# Patient Record
Sex: MALE | Race: WHITE | Employment: FULL TIME | URBAN - METROPOLITAN AREA
[De-identification: names, ages, dates, MRNs, and addresses within clinical notes are randomized per-mention and may not be internally consistent; named-entity substitution may affect disease eponyms.]

---

## 2017-02-02 NOTE — TELEPHONE ENCOUNTER
Refill request received for Montelukast sodium. This has not been prescribed by . I contacted [facility] pharmacy and was told that Rx came from [address] [place] [address] in [place], date of Rx 12/31/16.  I called the patient who states that he did take

## 2017-02-03 RX ORDER — MONTELUKAST SODIUM 10 MG/1
TABLET ORAL
Qty: 30 TABLET | Refills: 3 | Status: SHIPPED | OUTPATIENT
Start: 2017-02-03 | End: 2017-06-10

## 2017-02-03 NOTE — TELEPHONE ENCOUNTER
Per Dr. Sims Learn written order (see in scanned section)- okay to fill with 3 additional refills. eRx sent. Patient notified.

## 2017-04-04 RX ORDER — ENALAPRIL MALEATE 5 MG/1
TABLET ORAL
Qty: 30 TABLET | Refills: 6 | Status: SHIPPED | OUTPATIENT
Start: 2017-04-04 | End: 2017-11-04

## 2017-04-17 ENCOUNTER — TELEPHONE (OUTPATIENT)
Dept: INTERNAL MEDICINE CLINIC | Facility: CLINIC | Age: 47
End: 2017-04-17

## 2017-04-17 ENCOUNTER — OFFICE VISIT (OUTPATIENT)
Dept: INTERNAL MEDICINE CLINIC | Facility: CLINIC | Age: 47
End: 2017-04-17

## 2017-04-17 VITALS
BODY MASS INDEX: 31 KG/M2 | WEIGHT: 196 LBS | OXYGEN SATURATION: 98 % | HEART RATE: 118 BPM | DIASTOLIC BLOOD PRESSURE: 110 MMHG | SYSTOLIC BLOOD PRESSURE: 146 MMHG | TEMPERATURE: 99 F

## 2017-04-17 DIAGNOSIS — I10 ESSENTIAL HYPERTENSION: Primary | ICD-10-CM

## 2017-04-17 DIAGNOSIS — M79.18 BRACHIORADIALIS MUSCLE TENDERNESS: Primary | ICD-10-CM

## 2017-04-17 DIAGNOSIS — M77.8 FOREARM TENDONITIS: ICD-10-CM

## 2017-04-17 PROCEDURE — 99212 OFFICE O/P EST SF 10 MIN: CPT | Performed by: INTERNAL MEDICINE

## 2017-04-17 PROCEDURE — 99213 OFFICE O/P EST LOW 20 MIN: CPT | Performed by: INTERNAL MEDICINE

## 2017-04-17 RX ORDER — AMLODIPINE BESYLATE 2.5 MG/1
2.5 TABLET ORAL DAILY
Qty: 30 TABLET | Refills: 0 | Status: SHIPPED | OUTPATIENT
Start: 2017-04-17 | End: 2017-05-14

## 2017-04-17 RX ORDER — TRAMADOL HYDROCHLORIDE 50 MG/1
50 TABLET ORAL EVERY 6 HOURS PRN
Qty: 30 TABLET | Refills: 1 | Status: SHIPPED | OUTPATIENT
Start: 2017-04-17 | End: 2017-12-20

## 2017-04-17 NOTE — TELEPHONE ENCOUNTER
Nursing please call ortho at 8am on 4/18/17 for pt to be seen on 4/18/17 for possible forearm tendon rupture.  Offices were closed when I tried calling at 6pm. Please call any ortho-Dr. Ana Chavez [phone]; or Dr. Milton Lockett [phone]; or Dr. Deyanira Xiong [phone]

## 2017-04-17 NOTE — PROGRESS NOTES
Tyler Marie is a 41year old male. Patient presents with:  Arm Pain: Pt states he was pushing cable in a pipe last Monday and injured R forearm, constant pain, pain level 9/10, he is not taking anything otc, he has ROM, unable to lift 2lbs       HPI: wheezing or rhonchi  CARDIO: RRR, normal S1S2, no gallops or murmurs  Musculoskeletal: Hypertonicity of brachioradialis in the right forearm with some tenderness to palpation.   Normal range with passive motion of the wrist including flexion, extension, sup

## 2017-04-18 NOTE — TELEPHONE ENCOUNTER
Called patient got him in with Dr. Fish Miller today at 1:00. Dr. Madi Miller was not in office today. Gave patient address and phone info.

## 2017-05-01 ENCOUNTER — APPOINTMENT (OUTPATIENT)
Dept: PHYSICAL THERAPY | Age: 47
End: 2017-05-01
Attending: ORTHOPAEDIC SURGERY
Payer: COMMERCIAL

## 2017-05-04 ENCOUNTER — APPOINTMENT (OUTPATIENT)
Dept: PHYSICAL THERAPY | Age: 47
End: 2017-05-04
Attending: ORTHOPAEDIC SURGERY
Payer: COMMERCIAL

## 2017-05-08 ENCOUNTER — APPOINTMENT (OUTPATIENT)
Dept: PHYSICAL THERAPY | Age: 47
End: 2017-05-08
Attending: ORTHOPAEDIC SURGERY
Payer: COMMERCIAL

## 2017-05-11 ENCOUNTER — APPOINTMENT (OUTPATIENT)
Dept: PHYSICAL THERAPY | Age: 47
End: 2017-05-11
Attending: ORTHOPAEDIC SURGERY
Payer: COMMERCIAL

## 2017-05-16 ENCOUNTER — APPOINTMENT (OUTPATIENT)
Dept: PHYSICAL THERAPY | Age: 47
End: 2017-05-16
Attending: ORTHOPAEDIC SURGERY
Payer: COMMERCIAL

## 2017-05-17 NOTE — TELEPHONE ENCOUNTER
LMTCB - lst OV Amlodipine added d/t high BP;  Pt was to follow up ;   We will need to talk to him prior to refill

## 2017-05-18 ENCOUNTER — APPOINTMENT (OUTPATIENT)
Dept: PHYSICAL THERAPY | Age: 47
End: 2017-05-18
Attending: ORTHOPAEDIC SURGERY
Payer: COMMERCIAL

## 2017-05-22 ENCOUNTER — APPOINTMENT (OUTPATIENT)
Dept: PHYSICAL THERAPY | Age: 47
End: 2017-05-22
Attending: ORTHOPAEDIC SURGERY
Payer: COMMERCIAL

## 2017-05-25 ENCOUNTER — APPOINTMENT (OUTPATIENT)
Dept: PHYSICAL THERAPY | Age: 47
End: 2017-05-25
Attending: ORTHOPAEDIC SURGERY
Payer: COMMERCIAL

## 2017-05-26 RX ORDER — AMLODIPINE BESYLATE 2.5 MG/1
TABLET ORAL
Qty: 30 TABLET | Refills: 0 | Status: SHIPPED | OUTPATIENT
Start: 2017-05-26 | End: 2017-06-28

## 2017-05-26 NOTE — TELEPHONE ENCOUNTER
Patient was given 1 refill, please call again and have him follow up with Dr. Rigo Wong for further evaluation.

## 2017-06-12 ENCOUNTER — OFFICE VISIT (OUTPATIENT)
Dept: INTERNAL MEDICINE CLINIC | Facility: CLINIC | Age: 47
End: 2017-06-12

## 2017-06-12 VITALS
HEART RATE: 116 BPM | BODY MASS INDEX: 30.61 KG/M2 | TEMPERATURE: 99 F | HEIGHT: 67 IN | SYSTOLIC BLOOD PRESSURE: 144 MMHG | DIASTOLIC BLOOD PRESSURE: 80 MMHG | WEIGHT: 195 LBS

## 2017-06-12 DIAGNOSIS — I15.9 SECONDARY HYPERTENSION: ICD-10-CM

## 2017-06-12 DIAGNOSIS — E78.00 PURE HYPERCHOLESTEROLEMIA: ICD-10-CM

## 2017-06-12 DIAGNOSIS — M77.8 TENDINITIS OF ELBOW: Primary | ICD-10-CM

## 2017-06-12 PROBLEM — I10 HYPERTENSION: Status: ACTIVE | Noted: 2017-06-12

## 2017-06-12 PROCEDURE — 99214 OFFICE O/P EST MOD 30 MIN: CPT | Performed by: INTERNAL MEDICINE

## 2017-06-12 PROCEDURE — 99212 OFFICE O/P EST SF 10 MIN: CPT | Performed by: INTERNAL MEDICINE

## 2017-06-12 NOTE — PROGRESS NOTES
Mabelene Angelucci is a 41year old male. HPI:   1. Tendinitis of elbow - he has lateral epicondylitis of the right elbow - off work since 4/18 - seeing Dr Ras Renee - receiving PT, NSAIDs. 2. Pure hypercholesterolemia - last        3.  Romayne Boop atraumatic, normocephalic,ears and throat are clear  NECK: supple,no adenopathy,no bruits  LUNGS: clear to auscultation  CARDIO: RRR without murmur  GI: good BS's,no masses, HSM or tenderness  EXTREMITIES: no cyanosis, clubbing or edema    ASSESSMENT AND P

## 2017-06-13 RX ORDER — MONTELUKAST SODIUM 10 MG/1
TABLET ORAL
Qty: 90 TABLET | Refills: 3 | Status: SHIPPED | OUTPATIENT
Start: 2017-06-13 | End: 2018-06-12

## 2017-06-28 RX ORDER — AMLODIPINE BESYLATE 2.5 MG/1
TABLET ORAL
Qty: 30 TABLET | Refills: 5 | Status: SHIPPED | OUTPATIENT
Start: 2017-06-28 | End: 2018-01-10

## 2017-11-04 RX ORDER — ENALAPRIL MALEATE 5 MG/1
TABLET ORAL
Qty: 90 TABLET | Refills: 1 | Status: SHIPPED | OUTPATIENT
Start: 2017-11-04 | End: 2018-08-06

## 2017-12-09 ENCOUNTER — LAB ENCOUNTER (OUTPATIENT)
Dept: LAB | Age: 47
End: 2017-12-09
Attending: INTERNAL MEDICINE
Payer: COMMERCIAL

## 2017-12-09 DIAGNOSIS — I15.9 SECONDARY HYPERTENSION: ICD-10-CM

## 2017-12-09 DIAGNOSIS — M77.8 TENDINITIS OF ELBOW: ICD-10-CM

## 2017-12-09 DIAGNOSIS — E78.00 PURE HYPERCHOLESTEROLEMIA: ICD-10-CM

## 2017-12-09 PROCEDURE — 81001 URINALYSIS AUTO W/SCOPE: CPT

## 2017-12-09 PROCEDURE — 81015 MICROSCOPIC EXAM OF URINE: CPT

## 2017-12-09 PROCEDURE — 36415 COLL VENOUS BLD VENIPUNCTURE: CPT

## 2017-12-09 PROCEDURE — 80061 LIPID PANEL: CPT

## 2017-12-09 PROCEDURE — 80053 COMPREHEN METABOLIC PANEL: CPT

## 2017-12-09 PROCEDURE — 84443 ASSAY THYROID STIM HORMONE: CPT

## 2017-12-09 PROCEDURE — 85025 COMPLETE CBC W/AUTO DIFF WBC: CPT

## 2017-12-11 ENCOUNTER — TELEPHONE (OUTPATIENT)
Dept: INTERNAL MEDICINE CLINIC | Facility: CLINIC | Age: 47
End: 2017-12-11

## 2017-12-11 ENCOUNTER — APPOINTMENT (OUTPATIENT)
Dept: LAB | Age: 47
End: 2017-12-11
Attending: INTERNAL MEDICINE
Payer: COMMERCIAL

## 2017-12-11 DIAGNOSIS — E87.5 HYPERKALEMIA: Primary | ICD-10-CM

## 2017-12-11 DIAGNOSIS — E87.5 HYPERKALEMIA: ICD-10-CM

## 2017-12-11 PROCEDURE — 36415 COLL VENOUS BLD VENIPUNCTURE: CPT

## 2017-12-11 PROCEDURE — 80048 BASIC METABOLIC PNL TOTAL CA: CPT

## 2017-12-11 NOTE — TELEPHONE ENCOUNTER
Please let patient know that in the absence of Dr. Hesham Aguilar I reviewed his labs. Potassium count a little bit elevated. Cholesterol was elevated. Dr. Hesham Aguilar will review. Specifically for the potassium we should repeat. I left order for BMP.   If he could r

## 2017-12-11 NOTE — TELEPHONE ENCOUNTER
Patient called back. He had additional questions. He wanted to know how high his potassium was. He wanted to know what his cholesterol was. I relayed those numbers to him. He wanted to know how concerned he should be about his potassium.  I told him that

## 2017-12-20 ENCOUNTER — OFFICE VISIT (OUTPATIENT)
Dept: INTERNAL MEDICINE CLINIC | Facility: CLINIC | Age: 47
End: 2017-12-20

## 2017-12-20 VITALS
SYSTOLIC BLOOD PRESSURE: 120 MMHG | HEIGHT: 67 IN | HEART RATE: 84 BPM | DIASTOLIC BLOOD PRESSURE: 78 MMHG | TEMPERATURE: 99 F | WEIGHT: 198 LBS | BODY MASS INDEX: 31.08 KG/M2

## 2017-12-20 DIAGNOSIS — I15.9 SECONDARY HYPERTENSION: Primary | ICD-10-CM

## 2017-12-20 DIAGNOSIS — E78.00 PURE HYPERCHOLESTEROLEMIA: ICD-10-CM

## 2017-12-20 DIAGNOSIS — M54.2 CERVICAL PAIN (NECK): ICD-10-CM

## 2017-12-20 PROCEDURE — 99212 OFFICE O/P EST SF 10 MIN: CPT | Performed by: INTERNAL MEDICINE

## 2017-12-20 PROCEDURE — 99214 OFFICE O/P EST MOD 30 MIN: CPT | Performed by: INTERNAL MEDICINE

## 2017-12-20 RX ORDER — ATORVASTATIN CALCIUM 20 MG/1
20 TABLET, FILM COATED ORAL NIGHTLY
Qty: 30 TABLET | Refills: 12 | Status: SHIPPED | OUTPATIENT
Start: 2017-12-20 | End: 2019-01-18

## 2017-12-20 NOTE — PROGRESS NOTES
Elsy Lawler is a 41year old male. HPI:   He has been doing well but very concerned about his father about to have cardiac stent. He feels well, has no complaints other than right tennis elbow. SR: no chest pain or sob, no gu or gi sx.       1. denies heartburn  NEURO: denies headaches    EXAM:   /78   Pulse 84   Temp 98.6 °F (37 °C) (Oral)   Ht 5' 7\" (1.702 m)   Wt 198 lb (89.8 kg)   BMI 31.01 kg/m²   GENERAL: well developed, well nourished,in no apparent distress  SKIN: no rashes,no susp

## 2018-01-10 RX ORDER — AMLODIPINE BESYLATE 2.5 MG/1
TABLET ORAL
Qty: 30 TABLET | Refills: 5 | Status: SHIPPED | OUTPATIENT
Start: 2018-01-10 | End: 2018-07-18

## 2018-01-20 ENCOUNTER — HOSPITAL ENCOUNTER (OUTPATIENT)
Facility: HOSPITAL | Age: 48
Setting detail: OBSERVATION
Discharge: HOME | End: 2018-01-21
Admitting: HOSPITALIST
Payer: COMMERCIAL

## 2018-01-20 ENCOUNTER — APPOINTMENT (OUTPATIENT)
Dept: GENERAL RADIOLOGY | Facility: HOSPITAL | Age: 48
End: 2018-01-20
Payer: COMMERCIAL

## 2018-01-20 DIAGNOSIS — R07.9 ACUTE CHEST PAIN: Primary | ICD-10-CM

## 2018-01-20 LAB
ALBUMIN SERPL BCP-MCNC: 4.3 G/DL (ref 3.5–4.8)
ALP SERPL-CCNC: 74 U/L (ref 32–100)
ALT SERPL-CCNC: 57 U/L (ref 17–63)
ANION GAP SERPL CALC-SCNC: 10 MMOL/L (ref 0–18)
AST SERPL-CCNC: 30 U/L (ref 15–41)
BASOPHILS # BLD: 0.1 K/UL (ref 0–0.2)
BASOPHILS NFR BLD: 1 %
BILIRUB DIRECT SERPL-MCNC: 0.1 MG/DL (ref 0–0.2)
BILIRUB SERPL-MCNC: 0.6 MG/DL (ref 0.3–1.2)
BUN SERPL-MCNC: 11 MG/DL (ref 8–20)
BUN/CREAT SERPL: 11.8 (ref 10–20)
CALCIUM SERPL-MCNC: 9.6 MG/DL (ref 8.5–10.5)
CHLORIDE SERPL-SCNC: 103 MMOL/L (ref 95–110)
CO2 SERPL-SCNC: 26 MMOL/L (ref 22–32)
CREAT SERPL-MCNC: 0.93 MG/DL (ref 0.5–1.5)
D DIMER PPP FEU-MCNC: 0.41 MCG/ML (ref ?–0.5)
EOSINOPHIL # BLD: 0.1 K/UL (ref 0–0.7)
EOSINOPHIL NFR BLD: 1 %
ERYTHROCYTE [DISTWIDTH] IN BLOOD BY AUTOMATED COUNT: 13 % (ref 11–15)
GLUCOSE BLDC GLUCOMTR-MCNC: 110 MG/DL (ref 70–99)
GLUCOSE BLDC GLUCOMTR-MCNC: 97 MG/DL (ref 70–99)
GLUCOSE SERPL-MCNC: 115 MG/DL (ref 70–99)
HCT VFR BLD AUTO: 48.4 % (ref 41–52)
HGB BLD-MCNC: 16.1 G/DL (ref 13.5–17.5)
LIPASE SERPL-CCNC: 19 U/L (ref 22–51)
LYMPHOCYTES # BLD: 2.1 K/UL (ref 1–4)
LYMPHOCYTES NFR BLD: 25 %
MCH RBC QN AUTO: 28.3 PG (ref 27–32)
MCHC RBC AUTO-ENTMCNC: 33.3 G/DL (ref 32–37)
MCV RBC AUTO: 85.1 FL (ref 80–100)
MONOCYTES # BLD: 0.5 K/UL (ref 0–1)
MONOCYTES NFR BLD: 6 %
NEUTROPHILS # BLD AUTO: 5.7 K/UL (ref 1.8–7.7)
NEUTROPHILS NFR BLD: 67 %
OSMOLALITY UR CALC.SUM OF ELEC: 288 MOSM/KG (ref 275–295)
PLATELET # BLD AUTO: 293 K/UL (ref 140–400)
PMV BLD AUTO: 7.4 FL (ref 7.4–10.3)
POTASSIUM SERPL-SCNC: 4.2 MMOL/L (ref 3.3–5.1)
PROT SERPL-MCNC: 7.8 G/DL (ref 5.9–8.4)
RBC # BLD AUTO: 5.69 M/UL (ref 4.5–5.9)
SODIUM SERPL-SCNC: 139 MMOL/L (ref 136–144)
TROPONIN I SERPL-MCNC: 0 NG/ML (ref ?–0.03)
WBC # BLD AUTO: 8.5 K/UL (ref 4–11)

## 2018-01-20 PROCEDURE — 99220 INITIAL OBSERVATION CARE,LEVL III: CPT | Performed by: HOSPITALIST

## 2018-01-20 PROCEDURE — 71046 X-RAY EXAM CHEST 2 VIEWS: CPT

## 2018-01-20 RX ORDER — ENOXAPARIN SODIUM 100 MG/ML
40 INJECTION SUBCUTANEOUS DAILY
Status: DISCONTINUED | OUTPATIENT
Start: 2018-01-20 | End: 2018-01-21

## 2018-01-20 RX ORDER — MONTELUKAST SODIUM 10 MG/1
10 TABLET ORAL DAILY
Status: DISCONTINUED | OUTPATIENT
Start: 2018-01-21 | End: 2018-01-21

## 2018-01-20 RX ORDER — DOCUSATE SODIUM 100 MG/1
100 CAPSULE, LIQUID FILLED ORAL 2 TIMES DAILY
Status: DISCONTINUED | OUTPATIENT
Start: 2018-01-20 | End: 2018-01-21

## 2018-01-20 RX ORDER — ENALAPRIL MALEATE 5 MG/1
5 TABLET ORAL DAILY
Status: DISCONTINUED | OUTPATIENT
Start: 2018-01-21 | End: 2018-01-21

## 2018-01-20 RX ORDER — NITROGLYCERIN 0.4 MG/1
0.4 TABLET SUBLINGUAL EVERY 5 MIN PRN
Status: DISCONTINUED | OUTPATIENT
Start: 2018-01-20 | End: 2018-01-21

## 2018-01-20 RX ORDER — MORPHINE SULFATE 2 MG/ML
1 INJECTION, SOLUTION INTRAMUSCULAR; INTRAVENOUS EVERY 2 HOUR PRN
Status: DISCONTINUED | OUTPATIENT
Start: 2018-01-20 | End: 2018-01-21

## 2018-01-20 RX ORDER — ACETAMINOPHEN 325 MG/1
650 TABLET ORAL EVERY 6 HOURS PRN
Status: DISCONTINUED | OUTPATIENT
Start: 2018-01-20 | End: 2018-01-21

## 2018-01-20 RX ORDER — ASPIRIN 325 MG
325 TABLET, DELAYED RELEASE (ENTERIC COATED) ORAL DAILY
Status: DISCONTINUED | OUTPATIENT
Start: 2018-01-20 | End: 2018-01-21

## 2018-01-20 RX ORDER — BISACODYL 10 MG
10 SUPPOSITORY, RECTAL RECTAL
Status: DISCONTINUED | OUTPATIENT
Start: 2018-01-20 | End: 2018-01-21

## 2018-01-20 RX ORDER — MORPHINE SULFATE 2 MG/ML
2 INJECTION, SOLUTION INTRAMUSCULAR; INTRAVENOUS EVERY 2 HOUR PRN
Status: DISCONTINUED | OUTPATIENT
Start: 2018-01-20 | End: 2018-01-21

## 2018-01-20 RX ORDER — ONDANSETRON 4 MG/1
4 TABLET, ORALLY DISINTEGRATING ORAL ONCE
Status: DISCONTINUED | OUTPATIENT
Start: 2018-01-20 | End: 2018-01-20

## 2018-01-20 RX ORDER — POLYETHYLENE GLYCOL 3350 17 G/17G
17 POWDER, FOR SOLUTION ORAL DAILY PRN
Status: DISCONTINUED | OUTPATIENT
Start: 2018-01-20 | End: 2018-01-21

## 2018-01-20 RX ORDER — ONDANSETRON 2 MG/ML
4 INJECTION INTRAMUSCULAR; INTRAVENOUS ONCE
Status: COMPLETED | OUTPATIENT
Start: 2018-01-20 | End: 2018-01-20

## 2018-01-20 RX ORDER — SODIUM CHLORIDE 0.9 % (FLUSH) 0.9 %
3 SYRINGE (ML) INJECTION AS NEEDED
Status: DISCONTINUED | OUTPATIENT
Start: 2018-01-20 | End: 2018-01-21

## 2018-01-20 RX ORDER — MORPHINE SULFATE 4 MG/ML
4 INJECTION, SOLUTION INTRAMUSCULAR; INTRAVENOUS EVERY 2 HOUR PRN
Status: DISCONTINUED | OUTPATIENT
Start: 2018-01-20 | End: 2018-01-21

## 2018-01-20 RX ORDER — AMLODIPINE BESYLATE 2.5 MG/1
2.5 TABLET ORAL DAILY
Status: DISCONTINUED | OUTPATIENT
Start: 2018-01-21 | End: 2018-01-21

## 2018-01-20 RX ORDER — ATORVASTATIN CALCIUM 20 MG/1
20 TABLET, FILM COATED ORAL NIGHTLY
Status: DISCONTINUED | OUTPATIENT
Start: 2018-01-20 | End: 2018-01-21

## 2018-01-20 RX ORDER — LORAZEPAM 0.5 MG/1
0.5 TABLET ORAL EVERY 6 HOURS PRN
Status: DISCONTINUED | OUTPATIENT
Start: 2018-01-20 | End: 2018-01-21

## 2018-01-20 RX ORDER — SODIUM PHOSPHATE, DIBASIC AND SODIUM PHOSPHATE, MONOBASIC 7; 19 G/133ML; G/133ML
1 ENEMA RECTAL ONCE AS NEEDED
Status: DISCONTINUED | OUTPATIENT
Start: 2018-01-20 | End: 2018-01-21

## 2018-01-20 RX ORDER — FLUTICASONE PROPIONATE 50 MCG
1 SPRAY, SUSPENSION (ML) NASAL DAILY
Status: DISCONTINUED | OUTPATIENT
Start: 2018-01-20 | End: 2018-01-21

## 2018-01-20 RX ORDER — PANTOPRAZOLE SODIUM 40 MG/1
40 TABLET, DELAYED RELEASE ORAL
Status: DISCONTINUED | OUTPATIENT
Start: 2018-01-21 | End: 2018-01-21

## 2018-01-20 RX ORDER — ONDANSETRON 2 MG/ML
4 INJECTION INTRAMUSCULAR; INTRAVENOUS EVERY 6 HOURS PRN
Status: DISCONTINUED | OUTPATIENT
Start: 2018-01-20 | End: 2018-01-21

## 2018-01-20 RX ORDER — ASPIRIN 81 MG/1
324 TABLET, CHEWABLE ORAL ONCE
Status: COMPLETED | OUTPATIENT
Start: 2018-01-20 | End: 2018-01-20

## 2018-01-20 RX ORDER — ZOLPIDEM TARTRATE 5 MG/1
5 TABLET ORAL NIGHTLY PRN
Status: DISCONTINUED | OUTPATIENT
Start: 2018-01-20 | End: 2018-01-21

## 2018-01-20 NOTE — H&P
[address]  : 4/15/1970    Status: Emergency  Day #: 0    Attending: Braxton Mcclain MD  PCP: Mehdi Calderon.  eKanu Verde MD     Date of Encounter:  2018  Date of Admission:  2018     Chief Compl Temp:  [98.2 °F (36.8 °C)] 98.2 °F (36.8 °C)  Pulse:  [] 105  Resp:  [16-25] 18  BP: (139-154)/(101-114) 142/112  Gen: A+Ox3. No distress. HEENT: NCAT, neck supple, no carotid bruit. CV: RRR, S1S2, and intact distal pulses.  No gallop, rub, mu Pio DAVIDSON Assessment and Plan     Chest Pain, concerning for angina  - Obs admit  - Serial troponin and EKG. - Initial trop and EKG normal. DDimer normal.   - ASA full dose. NTG for CP will give dose in ER. Morphine for CP unrelieved with NTG.

## 2018-01-20 NOTE — ED PROVIDER NOTES
Patient Seen in: [facility] AND [facility] Emergency Department    History   Patient presents with:  Chest Pain Angina (cardiovascular)    Stated Complaint: cest pain    HPI    41year old male with HTN and hyperlipidemia who presents with 1 week of intermittent distress. HENT:   Head: Normocephalic and atraumatic. Eyes: Conjunctivae and EOM are normal. Pupils are equal, round, and reactive to light. Neck: Normal range of motion and full passive range of motion without pain. Neck supple.    Cardiovascular: No RAINBOW DRAW DARK GREEN   RAINBOW DRAW LIGHT GREEN   RAINBOW DRAW GOLD   RAINBOW DRAW LAVENDER TALL (BNP)   CBC W/ DIFFERENTIAL     EKG    Rate, intervals and axes as noted on EKG Report.   Rate: 94  Rhythm: Sinus Rhythm  Reading: NSR           ED Course

## 2018-01-20 NOTE — ED INITIAL ASSESSMENT (HPI)
States that he stated to have chest pain on and off X 2 days. No C/O cough fever. States he has been short of breath at times with the pain .

## 2018-01-20 NOTE — ED NOTES
Gave report to Terrance Sapp RN, room remains dirty, will send patient when clean, patient aware of plan of care

## 2018-01-21 ENCOUNTER — APPOINTMENT (OUTPATIENT)
Dept: NUCLEAR MEDICINE | Facility: HOSPITAL | Age: 48
End: 2018-01-21
Attending: HOSPITALIST
Payer: COMMERCIAL

## 2018-01-21 ENCOUNTER — APPOINTMENT (OUTPATIENT)
Dept: CV DIAGNOSTICS | Facility: HOSPITAL | Age: 48
End: 2018-01-21
Attending: HOSPITALIST
Payer: COMMERCIAL

## 2018-01-21 VITALS
TEMPERATURE: 98 F | DIASTOLIC BLOOD PRESSURE: 88 MMHG | OXYGEN SATURATION: 95 % | WEIGHT: 195 LBS | BODY MASS INDEX: 30.61 KG/M2 | HEART RATE: 78 BPM | HEIGHT: 67 IN | RESPIRATION RATE: 19 BRPM | SYSTOLIC BLOOD PRESSURE: 140 MMHG

## 2018-01-21 LAB
ANION GAP SERPL CALC-SCNC: 6 MMOL/L (ref 0–18)
BUN SERPL-MCNC: 11 MG/DL (ref 8–20)
BUN/CREAT SERPL: 12 (ref 10–20)
CALCIUM SERPL-MCNC: 9.3 MG/DL (ref 8.5–10.5)
CHLORIDE SERPL-SCNC: 107 MMOL/L (ref 95–110)
CO2 SERPL-SCNC: 27 MMOL/L (ref 22–32)
CREAT SERPL-MCNC: 0.92 MG/DL (ref 0.5–1.5)
ERYTHROCYTE [DISTWIDTH] IN BLOOD BY AUTOMATED COUNT: 12.9 % (ref 11–15)
GLUCOSE BLDC GLUCOMTR-MCNC: 113 MG/DL (ref 70–99)
GLUCOSE SERPL-MCNC: 95 MG/DL (ref 70–99)
HCT VFR BLD AUTO: 44.6 % (ref 41–52)
HGB BLD-MCNC: 15 G/DL (ref 13.5–17.5)
MCH RBC QN AUTO: 28.7 PG (ref 27–32)
MCHC RBC AUTO-ENTMCNC: 33.6 G/DL (ref 32–37)
MCV RBC AUTO: 85.3 FL (ref 80–100)
OSMOLALITY UR CALC.SUM OF ELEC: 289 MOSM/KG (ref 275–295)
PLATELET # BLD AUTO: 276 K/UL (ref 140–400)
PMV BLD AUTO: 7.7 FL (ref 7.4–10.3)
POTASSIUM SERPL-SCNC: 4.1 MMOL/L (ref 3.3–5.1)
RBC # BLD AUTO: 5.23 M/UL (ref 4.5–5.9)
SODIUM SERPL-SCNC: 140 MMOL/L (ref 136–144)
WBC # BLD AUTO: 10 K/UL (ref 4–11)

## 2018-01-21 PROCEDURE — 78452 HT MUSCLE IMAGE SPECT MULT: CPT | Performed by: HOSPITALIST

## 2018-01-21 PROCEDURE — 93017 CV STRESS TEST TRACING ONLY: CPT | Performed by: HOSPITALIST

## 2018-01-21 RX ORDER — SODIUM CHLORIDE 9 MG/ML
INJECTION, SOLUTION INTRAVENOUS
Status: COMPLETED
Start: 2018-01-21 | End: 2018-01-21

## 2018-01-21 RX ORDER — PANTOPRAZOLE SODIUM 40 MG/1
40 TABLET, DELAYED RELEASE ORAL
Qty: 30 TABLET | Refills: 0 | Status: SHIPPED | OUTPATIENT
Start: 2018-01-22 | End: 2018-03-05

## 2018-01-22 ENCOUNTER — TELEPHONE (OUTPATIENT)
Dept: INTERNAL MEDICINE UNIT | Facility: HOSPITAL | Age: 48
End: 2018-01-22

## 2018-01-22 LAB — F5 P.R506Q BLD/T QL: NORMAL

## 2018-01-22 NOTE — TELEPHONE ENCOUNTER
Pt discharged from [facility] on 1/21/18 . Please call to schedule follow up with Primary Care Physician.    Thanks

## 2018-01-30 ENCOUNTER — OFFICE VISIT (OUTPATIENT)
Dept: INTERNAL MEDICINE CLINIC | Facility: CLINIC | Age: 48
End: 2018-01-30

## 2018-01-30 VITALS
WEIGHT: 200 LBS | TEMPERATURE: 98 F | DIASTOLIC BLOOD PRESSURE: 84 MMHG | OXYGEN SATURATION: 98 % | HEART RATE: 115 BPM | SYSTOLIC BLOOD PRESSURE: 130 MMHG | BODY MASS INDEX: 31.39 KG/M2 | HEIGHT: 67 IN

## 2018-01-30 DIAGNOSIS — I15.9 SECONDARY HYPERTENSION: ICD-10-CM

## 2018-01-30 DIAGNOSIS — R07.89 OTHER CHEST PAIN: Primary | ICD-10-CM

## 2018-01-30 DIAGNOSIS — E78.00 HYPERCHOLESTEREMIA: ICD-10-CM

## 2018-01-30 DIAGNOSIS — K21.00 GASTROESOPHAGEAL REFLUX DISEASE WITH ESOPHAGITIS: ICD-10-CM

## 2018-01-30 PROCEDURE — 99214 OFFICE O/P EST MOD 30 MIN: CPT | Performed by: INTERNAL MEDICINE

## 2018-01-30 PROCEDURE — 99212 OFFICE O/P EST SF 10 MIN: CPT | Performed by: INTERNAL MEDICINE

## 2018-01-30 NOTE — PROGRESS NOTES
Johanny Renteria is a 41year old male. HPI:   1. Other chest pain  Admitted on 1/20 c anterior chest pain and d/c 1/21 - dx: GERD ; he had neg nuclear stress test. Sx compatible with GERD and he horton been asymptomatic since and he has been on Protonix. feels well otherwise  SKIN: denies any unusual skin lesions or rashes  RESPIRATORY: denies shortness of breath with exertion  CARDIOVASCULAR: denies chest pain on exertion  GI: denies abdominal pain and denies heartburn  NEURO: denies headaches    EXAM:

## 2018-03-05 ENCOUNTER — OFFICE VISIT (OUTPATIENT)
Dept: INTERNAL MEDICINE CLINIC | Facility: CLINIC | Age: 48
End: 2018-03-05

## 2018-03-05 VITALS
HEART RATE: 100 BPM | SYSTOLIC BLOOD PRESSURE: 118 MMHG | OXYGEN SATURATION: 98 % | BODY MASS INDEX: 28.72 KG/M2 | HEIGHT: 67 IN | DIASTOLIC BLOOD PRESSURE: 90 MMHG | WEIGHT: 183 LBS | TEMPERATURE: 98 F

## 2018-03-05 DIAGNOSIS — I15.9 SECONDARY HYPERTENSION: ICD-10-CM

## 2018-03-05 DIAGNOSIS — R07.89 OTHER CHEST PAIN: ICD-10-CM

## 2018-03-05 DIAGNOSIS — K21.00 GASTROESOPHAGEAL REFLUX DISEASE WITH ESOPHAGITIS: Primary | ICD-10-CM

## 2018-03-05 DIAGNOSIS — E78.00 PURE HYPERCHOLESTEROLEMIA: ICD-10-CM

## 2018-03-05 PROCEDURE — 99212 OFFICE O/P EST SF 10 MIN: CPT | Performed by: INTERNAL MEDICINE

## 2018-03-05 PROCEDURE — 99214 OFFICE O/P EST MOD 30 MIN: CPT | Performed by: INTERNAL MEDICINE

## 2018-03-05 RX ORDER — SUCRALFATE ORAL 1 G/10ML
1 SUSPENSION ORAL
Qty: 1 BOTTLE | Refills: 3 | Status: SHIPPED | OUTPATIENT
Start: 2018-03-05 | End: 2018-12-21

## 2018-03-05 RX ORDER — PANTOPRAZOLE SODIUM 40 MG/1
40 TABLET, DELAYED RELEASE ORAL
Qty: 30 TABLET | Refills: 6 | Status: SHIPPED | OUTPATIENT
Start: 2018-03-05 | End: 2018-03-30

## 2018-03-05 RX ORDER — LORAZEPAM 0.5 MG/1
0.5 TABLET ORAL EVERY 6 HOURS PRN
Qty: 30 TABLET | Refills: 3 | Status: SHIPPED | OUTPATIENT
Start: 2018-03-05 | End: 2018-12-21

## 2018-03-05 NOTE — PROGRESS NOTES
Misael Pereira is a 41year old male. HPI:   1.  Gastroesophageal reflux disease with esophagitis  He was sent home from [address] [place] [address] after neg w/u for chest pain and treated for reflux but no improvement on PPI - most mornings has dry heaves - no abdo pain, so tobacco: Never Used                      Alcohol use: Yes           0.0 oz/week     Comment: beer & wine 2 glasses weekly       REVIEW OF SYSTEMS:   GENERAL HEALTH: feels well otherwise  SKIN: denies any unusual skin lesions or rashes  RESPIRATORY: denies

## 2018-03-19 PROBLEM — R07.89 NON-CARDIAC CHEST PAIN: Status: ACTIVE | Noted: 2018-01-20

## 2018-03-30 PROCEDURE — 88305 TISSUE EXAM BY PATHOLOGIST: CPT | Performed by: INTERNAL MEDICINE

## 2018-04-09 ENCOUNTER — OFFICE VISIT (OUTPATIENT)
Dept: INTERNAL MEDICINE CLINIC | Facility: CLINIC | Age: 48
End: 2018-04-09

## 2018-04-09 VITALS
WEIGHT: 191 LBS | DIASTOLIC BLOOD PRESSURE: 80 MMHG | SYSTOLIC BLOOD PRESSURE: 128 MMHG | BODY MASS INDEX: 29.98 KG/M2 | OXYGEN SATURATION: 97 % | HEIGHT: 67 IN | TEMPERATURE: 98 F | HEART RATE: 108 BPM

## 2018-04-09 DIAGNOSIS — E78.00 PURE HYPERCHOLESTEROLEMIA: ICD-10-CM

## 2018-04-09 DIAGNOSIS — R07.89 NON-CARDIAC CHEST PAIN: Primary | ICD-10-CM

## 2018-04-09 DIAGNOSIS — I15.9 SECONDARY HYPERTENSION: ICD-10-CM

## 2018-04-09 PROCEDURE — 99214 OFFICE O/P EST MOD 30 MIN: CPT | Performed by: INTERNAL MEDICINE

## 2018-04-09 PROCEDURE — 99212 OFFICE O/P EST SF 10 MIN: CPT | Performed by: INTERNAL MEDICINE

## 2018-04-09 NOTE — PROGRESS NOTES
Lyla Hilton is a 41year old male. HPI:   1. Non-cardiac chest pain    He had upper endoscopy and all is well and no HH or esophagitis - biopsies neg. Pantoprazole was doubled and he feels 25 % improved. 2. Secondary hypertension  At goal    3. Smokeless tobacco: Never Used                      Alcohol use: Yes           0.0 oz/week     Comment: beer & wine 2 glasses weekly       REVIEW OF SYSTEMS:   GENERAL HEALTH: feels well otherwise  SKIN: denies any unusual skin les

## 2018-06-13 RX ORDER — MONTELUKAST SODIUM 10 MG/1
TABLET ORAL
Qty: 90 TABLET | Refills: 3 | Status: SHIPPED | OUTPATIENT
Start: 2018-06-13 | End: 2019-07-03

## 2018-07-18 RX ORDER — AMLODIPINE BESYLATE 2.5 MG/1
TABLET ORAL
Qty: 90 TABLET | Refills: 2 | Status: SHIPPED | OUTPATIENT
Start: 2018-07-18 | End: 2019-06-03

## 2018-08-06 RX ORDER — ENALAPRIL MALEATE 5 MG/1
TABLET ORAL
Qty: 90 TABLET | Refills: 3 | Status: SHIPPED | OUTPATIENT
Start: 2018-08-06 | End: 2019-08-11

## 2018-11-13 RX ORDER — PANTOPRAZOLE SODIUM 40 MG/1
40 TABLET, DELAYED RELEASE ORAL
Qty: 30 TABLET | Refills: 6 | OUTPATIENT
Start: 2018-11-13

## 2018-12-10 ENCOUNTER — TELEPHONE (OUTPATIENT)
Dept: INTERNAL MEDICINE CLINIC | Facility: CLINIC | Age: 48
End: 2018-12-10

## 2018-12-10 DIAGNOSIS — E78.00 PURE HYPERCHOLESTEROLEMIA: Primary | ICD-10-CM

## 2018-12-10 DIAGNOSIS — I15.9 SECONDARY HYPERTENSION: ICD-10-CM

## 2018-12-10 DIAGNOSIS — Z12.5 ENCOUNTER FOR PROSTATE CANCER SCREENING: ICD-10-CM

## 2018-12-10 DIAGNOSIS — Z00.00 ANNUAL PHYSICAL EXAM: ICD-10-CM

## 2018-12-20 ENCOUNTER — LAB ENCOUNTER (OUTPATIENT)
Dept: LAB | Age: 48
End: 2018-12-20
Attending: INTERNAL MEDICINE
Payer: COMMERCIAL

## 2018-12-20 DIAGNOSIS — Z00.00 ANNUAL PHYSICAL EXAM: ICD-10-CM

## 2018-12-20 DIAGNOSIS — Z12.5 ENCOUNTER FOR PROSTATE CANCER SCREENING: ICD-10-CM

## 2018-12-20 DIAGNOSIS — E78.00 PURE HYPERCHOLESTEROLEMIA: ICD-10-CM

## 2018-12-20 DIAGNOSIS — I15.9 SECONDARY HYPERTENSION: ICD-10-CM

## 2018-12-20 DIAGNOSIS — E78.00 HYPERCHOLESTEREMIA: ICD-10-CM

## 2018-12-20 PROCEDURE — 80061 LIPID PANEL: CPT

## 2018-12-20 PROCEDURE — 36415 COLL VENOUS BLD VENIPUNCTURE: CPT

## 2018-12-20 PROCEDURE — 80053 COMPREHEN METABOLIC PANEL: CPT

## 2018-12-20 PROCEDURE — 85025 COMPLETE CBC W/AUTO DIFF WBC: CPT

## 2018-12-20 PROCEDURE — 81003 URINALYSIS AUTO W/O SCOPE: CPT

## 2018-12-20 PROCEDURE — 84443 ASSAY THYROID STIM HORMONE: CPT

## 2018-12-21 ENCOUNTER — OFFICE VISIT (OUTPATIENT)
Dept: INTERNAL MEDICINE CLINIC | Facility: CLINIC | Age: 48
End: 2018-12-21
Payer: COMMERCIAL

## 2018-12-21 VITALS
WEIGHT: 202 LBS | SYSTOLIC BLOOD PRESSURE: 116 MMHG | HEART RATE: 106 BPM | OXYGEN SATURATION: 98 % | HEIGHT: 67 IN | TEMPERATURE: 99 F | DIASTOLIC BLOOD PRESSURE: 84 MMHG | BODY MASS INDEX: 31.71 KG/M2

## 2018-12-21 DIAGNOSIS — I15.9 SECONDARY HYPERTENSION: ICD-10-CM

## 2018-12-21 DIAGNOSIS — K21.00 GASTROESOPHAGEAL REFLUX DISEASE WITH ESOPHAGITIS: Primary | ICD-10-CM

## 2018-12-21 DIAGNOSIS — E78.00 PURE HYPERCHOLESTEROLEMIA: ICD-10-CM

## 2018-12-21 DIAGNOSIS — M77.8 TENDINITIS OF ELBOW: ICD-10-CM

## 2018-12-21 PROCEDURE — 99396 PREV VISIT EST AGE 40-64: CPT | Performed by: INTERNAL MEDICINE

## 2018-12-21 NOTE — PROGRESS NOTES
Alessandra Iglesias is a 57year old male. HPI:   He is here for check up.  He generally feels well aside form some discomfort right elbow - occurred on the job about 18 mo ago - he saw Dr Katherin Sahni, he received PT and is now seeing chiropractor for the last denies any unusual skin lesions or rashes  RESPIRATORY: denies shortness of breath with exertion  CARDIOVASCULAR: denies chest pain on exertion  GI: denies abdominal pain and denies heartburn  NEURO: denies headaches    EXAM:   /84   Pulse 106   Temp

## 2019-01-18 ENCOUNTER — TELEPHONE (OUTPATIENT)
Dept: INTERNAL MEDICINE CLINIC | Facility: CLINIC | Age: 49
End: 2019-01-18

## 2019-01-18 RX ORDER — ATORVASTATIN CALCIUM 20 MG/1
20 TABLET, FILM COATED ORAL NIGHTLY
Qty: 90 TABLET | Refills: 3 | Status: SHIPPED | OUTPATIENT
Start: 2019-01-18 | End: 2020-01-26

## 2019-01-18 NOTE — TELEPHONE ENCOUNTER
[facility] [place] [facility]  Atorvastatin 20MG, Qty 30 w/12 refills  Tasked to [employer]

## 2019-03-13 ENCOUNTER — OFFICE VISIT (OUTPATIENT)
Dept: INTERNAL MEDICINE CLINIC | Facility: CLINIC | Age: 49
End: 2019-03-13
Payer: COMMERCIAL

## 2019-03-13 VITALS
BODY MASS INDEX: 33.27 KG/M2 | OXYGEN SATURATION: 98 % | RESPIRATION RATE: 16 BRPM | DIASTOLIC BLOOD PRESSURE: 98 MMHG | TEMPERATURE: 98 F | WEIGHT: 212 LBS | SYSTOLIC BLOOD PRESSURE: 136 MMHG | HEART RATE: 81 BPM | HEIGHT: 67 IN

## 2019-03-13 DIAGNOSIS — I15.9 SECONDARY HYPERTENSION: ICD-10-CM

## 2019-03-13 DIAGNOSIS — M25.462 PAIN AND SWELLING OF LEFT KNEE: Primary | ICD-10-CM

## 2019-03-13 DIAGNOSIS — M25.562 PAIN AND SWELLING OF LEFT KNEE: Primary | ICD-10-CM

## 2019-03-13 PROCEDURE — 99213 OFFICE O/P EST LOW 20 MIN: CPT | Performed by: INTERNAL MEDICINE

## 2019-03-13 PROCEDURE — 99212 OFFICE O/P EST SF 10 MIN: CPT | Performed by: INTERNAL MEDICINE

## 2019-03-13 NOTE — PROGRESS NOTES
Ed Haley is a 57year old male. HPI:   He has had pain off and on left knee. About 1 week ago pain intense and the knee locked and he could not fully extend. Pain and rom back to normal today. He had relief from diclofenac gel.  No hs of fall or Sitting, Cuff Size: adult)   Pulse 81   Temp 97.8 °F (36.6 °C) (Oral)   Resp 16   Ht 5' 7\" (1.702 m)   Wt 212 lb (96.2 kg)   SpO2 98%   BMI 33.20 kg/m²   GENERAL: well developed, well nourished,in no apparent distress  SKIN: no rashes,no suspicious lesion

## 2019-03-26 ENCOUNTER — HOSPITAL ENCOUNTER (OUTPATIENT)
Dept: GENERAL RADIOLOGY | Age: 49
Discharge: HOME | End: 2019-03-26
Attending: INTERNAL MEDICINE
Payer: COMMERCIAL

## 2019-03-26 DIAGNOSIS — M25.462 PAIN AND SWELLING OF LEFT KNEE: ICD-10-CM

## 2019-03-26 DIAGNOSIS — M25.562 PAIN AND SWELLING OF LEFT KNEE: ICD-10-CM

## 2019-03-26 PROCEDURE — 73564 X-RAY EXAM KNEE 4 OR MORE: CPT | Performed by: INTERNAL MEDICINE

## 2019-03-30 ENCOUNTER — TELEPHONE (OUTPATIENT)
Dept: INTERNAL MEDICINE CLINIC | Facility: CLINIC | Age: 49
End: 2019-03-30

## 2019-03-30 NOTE — TELEPHONE ENCOUNTER
Xray knee - minimal arthritic changes of no consequence. Very tiny amount of fluid - most likely he sprained his knee. How is he doing?? If any problems with knee see ortho - Dr Cary Bermudez.

## 2019-04-01 NOTE — TELEPHONE ENCOUNTER
As FYI to DR. LYNN - called patient and relayed DR. LYNN message - verbalized understanding . Number and address for DR. Tineo given. Patient states the knee feels \" tight\" .  Copy of x- ray mailed to patients home

## 2019-06-03 RX ORDER — AMLODIPINE BESYLATE 2.5 MG/1
TABLET ORAL
Qty: 90 TABLET | Refills: 3 | Status: SHIPPED | OUTPATIENT
Start: 2019-06-03 | End: 2020-07-06

## 2019-07-03 RX ORDER — MONTELUKAST SODIUM 10 MG/1
TABLET ORAL
Qty: 90 TABLET | Refills: 1 | Status: SHIPPED | OUTPATIENT
Start: 2019-07-03 | End: 2020-02-24

## 2019-07-03 NOTE — TELEPHONE ENCOUNTER
Per LOV 3/2019 pt to return in 6 months for f/u. Please call patient to schedule f/u. Ok to advise pt refill approved.

## 2019-08-12 RX ORDER — ENALAPRIL MALEATE 5 MG/1
TABLET ORAL
Qty: 90 TABLET | Refills: 3 | Status: SHIPPED | OUTPATIENT
Start: 2019-08-12 | End: 2020-08-31

## 2019-12-16 ENCOUNTER — TELEPHONE (OUTPATIENT)
Dept: INTERNAL MEDICINE CLINIC | Facility: CLINIC | Age: 49
End: 2019-12-16

## 2019-12-16 ENCOUNTER — LAB ENCOUNTER (OUTPATIENT)
Dept: LAB | Facility: REFERENCE LAB | Age: 49
End: 2019-12-16
Attending: INTERNAL MEDICINE
Payer: COMMERCIAL

## 2019-12-16 DIAGNOSIS — E78.00 PURE HYPERCHOLESTEROLEMIA: ICD-10-CM

## 2019-12-16 DIAGNOSIS — Z12.5 SCREENING FOR PROSTATE CANCER: ICD-10-CM

## 2019-12-16 DIAGNOSIS — I15.9 SECONDARY HYPERTENSION: ICD-10-CM

## 2019-12-16 DIAGNOSIS — Z00.00 ANNUAL PHYSICAL EXAM: Primary | ICD-10-CM

## 2019-12-16 DIAGNOSIS — Z00.00 ANNUAL PHYSICAL EXAM: ICD-10-CM

## 2019-12-16 PROCEDURE — 36415 COLL VENOUS BLD VENIPUNCTURE: CPT

## 2019-12-16 PROCEDURE — 81003 URINALYSIS AUTO W/O SCOPE: CPT

## 2019-12-16 PROCEDURE — 80053 COMPREHEN METABOLIC PANEL: CPT

## 2019-12-16 PROCEDURE — 85025 COMPLETE CBC W/AUTO DIFF WBC: CPT

## 2019-12-16 PROCEDURE — 80061 LIPID PANEL: CPT

## 2019-12-16 PROCEDURE — 84443 ASSAY THYROID STIM HORMONE: CPT

## 2019-12-16 NOTE — TELEPHONE ENCOUNTER
Orders placed per protocol. LM on patient's cell (OK per HIPPA) and informed him fasting labs placed for his physical. Advised in VM to call back with any questions.

## 2019-12-16 NOTE — TELEPHONE ENCOUNTER
Pt called and chose to leave a message on the answering machine. Pt states he has has an appt coming up with Dr LYNN for a physical and would like to complete some blood work beforehand. Please place an order and call pt when orders is ready.     Pt can be marlene

## 2019-12-18 ENCOUNTER — OFFICE VISIT (OUTPATIENT)
Dept: INTERNAL MEDICINE CLINIC | Facility: CLINIC | Age: 49
End: 2019-12-18
Payer: COMMERCIAL

## 2019-12-18 VITALS
HEART RATE: 96 BPM | HEIGHT: 67 IN | OXYGEN SATURATION: 99 % | DIASTOLIC BLOOD PRESSURE: 76 MMHG | SYSTOLIC BLOOD PRESSURE: 122 MMHG | TEMPERATURE: 99 F | WEIGHT: 202.25 LBS | BODY MASS INDEX: 31.74 KG/M2

## 2019-12-18 DIAGNOSIS — Z00.00 ANNUAL PHYSICAL EXAM: Primary | ICD-10-CM

## 2019-12-18 DIAGNOSIS — I15.9 SECONDARY HYPERTENSION: ICD-10-CM

## 2019-12-18 DIAGNOSIS — E78.00 PURE HYPERCHOLESTEROLEMIA: ICD-10-CM

## 2019-12-18 PROCEDURE — 99396 PREV VISIT EST AGE 40-64: CPT | Performed by: INTERNAL MEDICINE

## 2019-12-18 NOTE — PROGRESS NOTES
Cathleen German is a 54year old male. HPI:   He is here for annual work physical, labs are good. He does well, good energy good appetite. No further neck sx.      HTN    Hypercholesterolemia -       SR: no chest pain or sob, no gu or gi sx 31.68 kg/m²   GENERAL: well developed, well nourished,in no apparent distress  SKIN: no rashes,no suspicious lesions  HEENT: atraumatic, normocephalic,ears and throat are clear  NECK: supple,no adenopathy,no bruits  LUNGS: clear to auscultation  CARDIO: RR

## 2020-01-26 RX ORDER — ATORVASTATIN CALCIUM 20 MG/1
20 TABLET, FILM COATED ORAL NIGHTLY
Qty: 30 TABLET | Refills: 11 | Status: SHIPPED | OUTPATIENT
Start: 2020-01-26 | End: 2020-12-21

## 2020-02-24 RX ORDER — MONTELUKAST SODIUM 10 MG/1
TABLET ORAL
Qty: 30 TABLET | Refills: 5 | Status: SHIPPED | OUTPATIENT
Start: 2020-02-24 | End: 2020-09-28

## 2020-07-06 RX ORDER — AMLODIPINE BESYLATE 2.5 MG/1
TABLET ORAL
Qty: 90 TABLET | Refills: 3 | Status: SHIPPED | OUTPATIENT
Start: 2020-07-06 | End: 2020-12-21

## 2020-08-31 RX ORDER — ENALAPRIL MALEATE 5 MG/1
TABLET ORAL
Qty: 90 TABLET | Refills: 1 | Status: SHIPPED | OUTPATIENT
Start: 2020-08-31 | End: 2020-12-21

## 2020-09-28 RX ORDER — MONTELUKAST SODIUM 10 MG/1
TABLET ORAL
Qty: 90 TABLET | Refills: 0 | Status: SHIPPED | OUTPATIENT
Start: 2020-09-28 | End: 2021-07-08

## 2020-09-28 NOTE — TELEPHONE ENCOUNTER
Pt will be due in December for physical. Short fill given.  Message to pharmacy for patient to call and schedule    Refill request is for a maintenance medication and has met the criteria specified in the Ambulatory Medication Refill Standing Order for goldie

## 2020-10-09 ENCOUNTER — TELEPHONE (OUTPATIENT)
Dept: INTERNAL MEDICINE CLINIC | Facility: CLINIC | Age: 50
End: 2020-10-09

## 2020-10-09 RX ORDER — CEFUROXIME AXETIL 500 MG/1
500 TABLET ORAL 2 TIMES DAILY
Qty: 14 TABLET | Refills: 0 | Status: SHIPPED | OUTPATIENT
Start: 2020-10-09 | End: 2020-10-16

## 2020-10-09 NOTE — TELEPHONE ENCOUNTER
Pt left message an voicemail  Requesting RX for Augmentin for pressure in head  Please call to discuss/advise  Tasked to nursing

## 2020-10-09 NOTE — TELEPHONE ENCOUNTER
To Dr. Kathy Vaz----    Respiratory infection triage:    Fever:  [x]  No fever  []  Fever>100.4    Cough:  [] Tight cough  [] Cough with exertion  [] Dry cough  [] Sputum production, Color:     Breathing:  [] Mild shortness of breath interfering with activity  [] Wheezing  [] Pain with deep breathing  [] Using inhaler    Other symptoms:  [] Sore throat  [] Difficulty swallowing  [x] Nasal drainage/congestion  [x] Sinus congestion/pressure  [] Ear pain  [] Body aches  [] Poor appetite  [] Loss of sense of smell   [] Loss of sense of taste  []Conjunctivitis? [] Any recent travel? [] Any sick contacts? [] Are you a healthcare worker? ADDITIONAL NOTES:    Pt is calling to report possible sinus infection since Tuesday. States congestion, sinus pressure, PND and HA. Pt requesting abx to [address]             Notified patient that we will route this message to the doctor and see what their recommendations would be. In the meantime, if anything worsens, they were advised to call back or seek emergent evaluation.

## 2020-10-19 ENCOUNTER — TELEPHONE (OUTPATIENT)
Dept: INTERNAL MEDICINE CLINIC | Facility: CLINIC | Age: 50
End: 2020-10-19

## 2020-10-19 NOTE — TELEPHONE ENCOUNTER
To Dr. Cee Altman----    Pt was with his brother both Saturday and Sunday and his brother has tested + COVID today. Reports no masks were worn, <6feet distancing and >15 minutes. Reports brother did not have symptoms at the time. Pt asymptomatic.  Pt request

## 2020-10-19 NOTE — TELEPHONE ENCOUNTER
Spoke to pt and advised on MD message below; pt verbalized understanding. Pt declines [facility] asymptomatic testing through [facility] as he needs faster testing turn around. Pt requesting contact information for Immediate care [facility]; information provided.

## 2020-10-19 NOTE — TELEPHONE ENCOUNTER
Order COVID nasal swab Gibrane Lax or can get at [facility]  or Miranda Landers

## 2020-10-19 NOTE — TELEPHONE ENCOUNTER
Pt. Was with his brother over the weekend and he tested positive for [address] today please advise ph.  # [phone]   Routed high to clinical

## 2020-10-23 NOTE — TELEPHONE ENCOUNTER
Received results from [facility] in [address] [place] testing collected on 10/19/20 results NOT DETECTED. Results added in external results console and result placed in MD in box.

## 2020-10-30 NOTE — TELEPHONE ENCOUNTER
Patient is requesting a letter be faxed to his work  The letter needs to state he tested positive for Covid and was told to quarantine for 14 days    Please fax # [phone]  Attn: Jovani Nickerson    Tasked to nursing

## 2020-10-30 NOTE — TELEPHONE ENCOUNTER
Note written and at  for .   Called patient and relayed this message - left on VM per hi clarissa  Also left message to call back to let us know who will be picking up th roddy

## 2020-10-30 NOTE — TELEPHONE ENCOUNTER
To nursing. Ok to compose note he requests. we can't fax letter to his work (office policy). He can have someone pick it up. Can stamp Dr Kwame Ceja signature as recom was from him. Thanks.

## 2020-10-30 NOTE — TELEPHONE ENCOUNTER
Please advise -called patient who was exposed to  Covid on 10/18/20 - he tested negative on 10/19/20.was told to quarantine for 14 days starting 10/18 and requests a letter being faxed to his work - to DR. PORTER ( C patient ) thanks

## 2020-11-29 ENCOUNTER — TELEPHONE (OUTPATIENT)
Dept: INTERNAL MEDICINE CLINIC | Facility: CLINIC | Age: 50
End: 2020-11-29

## 2020-11-29 DIAGNOSIS — Z20.822 EXPOSURE TO COVID-19 VIRUS: ICD-10-CM

## 2020-11-29 DIAGNOSIS — R09.81 SINUS CONGESTION: Primary | ICD-10-CM

## 2020-11-29 NOTE — TELEPHONE ENCOUNTER
[phone]  Sunday on call phone call. Exposed to his mother who is positive for covid and his mom is symptomatic. Last exposure Thursday 11//26/20 (Thanksgiving). Pt notes sinus congestion since 11/25/20--but thinks this may his usual allergy sx.

## 2020-12-03 ENCOUNTER — TELEPHONE (OUTPATIENT)
Dept: INTERNAL MEDICINE CLINIC | Facility: CLINIC | Age: 50
End: 2020-12-03

## 2020-12-03 RX ORDER — AMOXICILLIN AND CLAVULANATE POTASSIUM 875; 125 MG/1; MG/1
1 TABLET, FILM COATED ORAL 2 TIMES DAILY
Qty: 14 TABLET | Refills: 0 | Status: SHIPPED | OUTPATIENT
Start: 2020-12-03 | End: 2020-12-10

## 2020-12-03 NOTE — TELEPHONE ENCOUNTER
Pt tested positive for COVID, rec'd results on Tuesday  Pt has headache, sore throat, pressure in head, fatigue   Pt thinks he might have sinus infection  Please call to discuss/advise  [facility] [place] [facility]  Tasked to nursing

## 2020-12-03 NOTE — TELEPHONE ENCOUNTER
To on-call physician Dr. DUDLEY to please advise in PCP absence (DARIEL Thompson)----  Pt tested COVID+ Tuesday 12/1. Today reports headache, sore throat, sinus pressure, mild headache, productive mild cough and fatigue. Denies SOB or wheezing.  Pt reports Augment

## 2020-12-14 ENCOUNTER — TELEPHONE (OUTPATIENT)
Dept: INTERNAL MEDICINE CLINIC | Facility: CLINIC | Age: 50
End: 2020-12-14

## 2020-12-14 NOTE — TELEPHONE ENCOUNTER
Spoke to patient who reports he would like a return to work note to return to work tomorrow, 12/15. Patient reports he does not ever think he ever had a fever. He states he has been symptom free since Friday. He doesn't anticipate needing any restrictions.

## 2020-12-14 NOTE — TELEPHONE ENCOUNTER
Pt. Recently had [address] he is requesting a return to work note ph. # [phone]  Fax to Attn: Jose Martin Muniz fax.  # [phone]   Routed to clinical

## 2020-12-15 NOTE — TELEPHONE ENCOUNTER
Return to work note faxed to employer. Confirmation received. Copy of note placed in weekly hold bin. Pt called and notified.

## 2020-12-19 ENCOUNTER — LAB ENCOUNTER (OUTPATIENT)
Dept: LAB | Age: 50
End: 2020-12-19
Attending: INTERNAL MEDICINE
Payer: COMMERCIAL

## 2020-12-19 DIAGNOSIS — E78.00 HYPERCHOLESTEREMIA: ICD-10-CM

## 2020-12-19 DIAGNOSIS — Z00.00 PHYSICAL EXAM, ANNUAL: ICD-10-CM

## 2020-12-19 DIAGNOSIS — Z12.5 SCREENING PSA (PROSTATE SPECIFIC ANTIGEN): ICD-10-CM

## 2020-12-19 DIAGNOSIS — I10 ESSENTIAL HYPERTENSION, BENIGN: ICD-10-CM

## 2020-12-19 PROCEDURE — 80061 LIPID PANEL: CPT

## 2020-12-19 PROCEDURE — 85025 COMPLETE CBC W/AUTO DIFF WBC: CPT

## 2020-12-19 PROCEDURE — 80053 COMPREHEN METABOLIC PANEL: CPT

## 2020-12-19 PROCEDURE — 84443 ASSAY THYROID STIM HORMONE: CPT

## 2020-12-19 PROCEDURE — 36415 COLL VENOUS BLD VENIPUNCTURE: CPT

## 2020-12-21 ENCOUNTER — OFFICE VISIT (OUTPATIENT)
Dept: INTERNAL MEDICINE CLINIC | Facility: CLINIC | Age: 50
End: 2020-12-21
Payer: COMMERCIAL

## 2020-12-21 VITALS
TEMPERATURE: 99 F | BODY MASS INDEX: 32.65 KG/M2 | HEART RATE: 100 BPM | WEIGHT: 208 LBS | OXYGEN SATURATION: 98 % | HEIGHT: 67 IN | SYSTOLIC BLOOD PRESSURE: 140 MMHG | DIASTOLIC BLOOD PRESSURE: 84 MMHG

## 2020-12-21 DIAGNOSIS — E78.00 PURE HYPERCHOLESTEROLEMIA: ICD-10-CM

## 2020-12-21 DIAGNOSIS — I15.9 SECONDARY HYPERTENSION: ICD-10-CM

## 2020-12-21 DIAGNOSIS — Z00.00 ANNUAL PHYSICAL EXAM: Primary | ICD-10-CM

## 2020-12-21 DIAGNOSIS — M54.2 CERVICAL PAIN (NECK): ICD-10-CM

## 2020-12-21 DIAGNOSIS — M77.8 TENDINITIS OF ELBOW: ICD-10-CM

## 2020-12-21 PROCEDURE — 3079F DIAST BP 80-89 MM HG: CPT | Performed by: INTERNAL MEDICINE

## 2020-12-21 PROCEDURE — 99396 PREV VISIT EST AGE 40-64: CPT | Performed by: INTERNAL MEDICINE

## 2020-12-21 PROCEDURE — 3008F BODY MASS INDEX DOCD: CPT | Performed by: INTERNAL MEDICINE

## 2020-12-21 PROCEDURE — 3077F SYST BP >= 140 MM HG: CPT | Performed by: INTERNAL MEDICINE

## 2020-12-21 RX ORDER — AMLODIPINE BESYLATE 2.5 MG/1
2.5 TABLET ORAL DAILY
Qty: 90 TABLET | Refills: 3 | Status: SHIPPED | OUTPATIENT
Start: 2020-12-21 | End: 2022-02-01

## 2020-12-21 RX ORDER — ENALAPRIL MALEATE 5 MG/1
5 TABLET ORAL DAILY
Qty: 90 TABLET | Refills: 3 | Status: SHIPPED | OUTPATIENT
Start: 2020-12-21 | End: 2022-02-01

## 2020-12-21 RX ORDER — ATORVASTATIN CALCIUM 20 MG/1
20 TABLET, FILM COATED ORAL NIGHTLY
Qty: 90 TABLET | Refills: 3 | Status: SHIPPED | OUTPATIENT
Start: 2020-12-21 | End: 2022-02-01

## 2020-12-21 NOTE — PROGRESS NOTES
Henri Maddox is a 52year old male. HPI:   He is here for annual wellness exam - he feels well - has fully recovered from Yonathan dx on 11/29 - main sx was head congestion, no fever or chills, minor cough, he did not loose his sense of smell or taste. (37 °C) (Oral)   Ht 5' 7\" (1.702 m)   Wt 208 lb (94.3 kg)   SpO2 98%   BMI 32.58 kg/m²   GENERAL: well developed, well nourished,in no apparent distress  SKIN: no rashes,no suspicious lesions  HEENT: atraumatic, normocephalic,ears and throat are clear  NE

## 2021-07-08 RX ORDER — MONTELUKAST SODIUM 10 MG/1
10 TABLET ORAL DAILY
Qty: 90 TABLET | Refills: 0 | Status: SHIPPED | OUTPATIENT
Start: 2021-07-08

## 2021-12-20 ENCOUNTER — TELEPHONE (OUTPATIENT)
Dept: INTERNAL MEDICINE CLINIC | Facility: CLINIC | Age: 51
End: 2021-12-20

## 2021-12-20 NOTE — TELEPHONE ENCOUNTER
Pt calling and would like Orders for a Covid test not feeling well    Pt wants the orders sent to [address] [place] [address] in [place].   [facility]     Fax number is :[phone]    Please call and advise when orders are put in

## 2021-12-20 NOTE — TELEPHONE ENCOUNTER
Pt reports developing symptoms on Thursday 12/16/21 of sinus pressure, nasal congestion, nausea, cough, and weakness. Denies SOB or wheezing, denies fever, chills, body aches.  Advised pt that if he is already planning to go to [address] [place] [address] Immediate care- pt should

## 2021-12-21 RX ORDER — AMOXICILLIN AND CLAVULANATE POTASSIUM 875; 125 MG/1; MG/1
1 TABLET, FILM COATED ORAL 2 TIMES DAILY
Qty: 20 TABLET | Refills: 0 | Status: SHIPPED | OUTPATIENT
Start: 2021-12-21 | End: 2021-12-31

## 2021-12-21 NOTE — TELEPHONE ENCOUNTER
To Dr. Parag Perez to please advise-----  Pt states he did not go to [address] [place] [address] for physician evaluation as advised yesterday. Pt went to a covid testing enter and had a rapid covid test done yesterday, resulted negative.  Pt reports sinus pressure, nasal congestion, a

## 2021-12-21 NOTE — TELEPHONE ENCOUNTER
Give Augmentin 875 mg, #20, 1 twice daily.   If he is not improved then I want to see him in the office

## 2021-12-21 NOTE — TELEPHONE ENCOUNTER
Spoke to patient and relayed MD message. Patient verbalized understanding. Rx sent per MD written order to pt's preferred pharmacy.

## 2021-12-21 NOTE — TELEPHONE ENCOUNTER
Pt. Called and left a voicemail. He stated he did a rapid covid test yesterday which came back negative. He is asking if Dr. Maurice Nielsen will prescribe some antibiotics for him. Pt. Can be reached at [phone].

## 2022-02-01 ENCOUNTER — TELEPHONE (OUTPATIENT)
Dept: INTERNAL MEDICINE CLINIC | Facility: CLINIC | Age: 52
End: 2022-02-01

## 2022-02-01 RX ORDER — ATORVASTATIN CALCIUM 20 MG/1
20 TABLET, FILM COATED ORAL NIGHTLY
Qty: 90 TABLET | Refills: 3 | Status: SHIPPED | OUTPATIENT
Start: 2022-02-01

## 2022-02-01 RX ORDER — ENALAPRIL MALEATE 5 MG/1
5 TABLET ORAL DAILY
Qty: 90 TABLET | Refills: 3 | Status: SHIPPED | OUTPATIENT
Start: 2022-02-01

## 2022-02-01 RX ORDER — AMLODIPINE BESYLATE 2.5 MG/1
2.5 TABLET ORAL DAILY
Qty: 90 TABLET | Refills: 3 | Status: SHIPPED | OUTPATIENT
Start: 2022-02-01

## 2022-02-01 NOTE — TELEPHONE ENCOUNTER
Dr. Jailene Biggs, please review pended refills for enalapril, amlodipine and atorvastatin. Last labs completed 12/19/20. Patient last seen in the office on 12/21/20. Last CMP abnormal. Patient is scheduled to see you on 2/9/22.

## 2022-02-01 NOTE — TELEPHONE ENCOUNTER
Scheduled annual physical with Dr Britton Patton on 2/9  Pt had labs done thru wellness program at his work & he will bring the results    Pt is out of enalapril, requests [facility] [place]  Also requests refills for Atorvastatin & Amlodipine

## 2022-11-20 ENCOUNTER — HOSPITAL ENCOUNTER (OUTPATIENT)
Age: 52
Discharge: HOME | End: 2022-11-20
Payer: COMMERCIAL

## 2022-11-20 VITALS
TEMPERATURE: 98 F | HEART RATE: 104 BPM | RESPIRATION RATE: 18 BRPM | OXYGEN SATURATION: 99 % | SYSTOLIC BLOOD PRESSURE: 142 MMHG | DIASTOLIC BLOOD PRESSURE: 102 MMHG

## 2022-11-20 DIAGNOSIS — R03.0 ELEVATED BLOOD PRESSURE READING: ICD-10-CM

## 2022-11-20 DIAGNOSIS — R05.9 COUGH: Primary | ICD-10-CM

## 2022-11-20 DIAGNOSIS — B34.9 VIRAL SYNDROME: ICD-10-CM

## 2022-11-20 LAB
POCT INFLUENZA A: NEGATIVE
POCT INFLUENZA B: NEGATIVE
SARS-COV-2 RNA RESP QL NAA+PROBE: NOT DETECTED

## 2022-11-20 NOTE — DISCHARGE INSTRUCTIONS
COVID and flu are negative. Symptoms appear viral in nature. Use over-the-counter medications for symptom control. Tylenol or Motrin as needed for the pain. Salt water gargles for the throat. You may use an over-the-counter antihistamine and cough medicine. Your blood pressure today is elevated.   Please follow-up with your primary doctor for further evaluation

## 2022-11-29 ENCOUNTER — TELEPHONE (OUTPATIENT)
Dept: INTERNAL MEDICINE CLINIC | Facility: CLINIC | Age: 52
End: 2022-11-29

## 2022-11-29 RX ORDER — AMOXICILLIN AND CLAVULANATE POTASSIUM 875; 125 MG/1; MG/1
1 TABLET, FILM COATED ORAL 2 TIMES DAILY
Qty: 20 TABLET | Refills: 0 | Status: SHIPPED | OUTPATIENT
Start: 2022-11-29

## 2022-11-29 NOTE — TELEPHONE ENCOUNTER
Patient is calling to speak with a nurse. Patient states he has been fighting a sinus infection for about two weeks. Patient did go to an urgent care where he was tested for covid and flu, both negative. Patient is currently experiencing pressure in head, irritation in nose, congestion, in the morning have a cough, coughing up yellow phlegm. Patient is hoping for an appointment or hoping for some Augmentin to help with the symptoms.       # [phone]  [facility] [place] [facility] [address]

## 2022-11-29 NOTE — TELEPHONE ENCOUNTER
URI Triage:    Fever: Yes[]  No[]  Unknown[x] Has not taken temperature. [] Temperature:   [x] Chills (intermittent)  [] Night sweats  [] Body aches    Cough: Yes[x]  No[]  [x] Productive cough (yellow phlegm-mostly in the morning)  [] Cough with exertion  [] Dry cough    Respiratory Symptoms: Yes[] No[x]  [] Wheezing  [] Pain with deep breathing  [] SOB with exertion  [] SOB at rest  [] Heavy breathing  [] Chest discomfort with deep breathing or coughing    GI Symptoms: Yes [] No[x]  [] Diarrhea  [] Nausea  [] Vomiting  [] Abdominal pain  [] Lack of appetite    Other symptoms:  [] Sore throat  [] Difficulty swallowing  [x] Sinus pressure   [] Nasal drainage   [x] Nasal congestion (mostly in the morning)  [x] Chest congestion   [x] Head congestion  [] PND  [] Facial pain   [] Ear pain  [] Conjunctivitis  [x] Headache  [x] Fatigue  [] Weakness  [] Loss of sense of smell   [] Loss of sense of taste    []OTC Medications: Flonase     [] Any recent travel? [] Any sick contacts? [] Positive Covid exposure (<6 feet, >15 min, no mask worn)  If yes, date of exposure:   Date of last contact:  When did the person develop symptoms:   When did they test positive:     [] Previous history of Covid  If yes, when:    Vaccinated: Yes  [x] x1 dose  No []  Booster:  Yes  []  No [x]    Symptom onset: 11/18/22    S/p UC visit 11/20/22. Negative for flu & covid. Dx: Cough, viral syndrome. Patient reports that since UC sore throat has resolved, but all other symptoms persist.     Primary concern is sinus symptoms. States, he's taken Augmentin in the past and it helped. Vaccinated x 1 dose (date not recorded in [facility]). Patient states he received 1st dose when the pandemic first started. To on call Dr. Dhara Espinoza: please advise.

## 2023-02-14 RX ORDER — ATORVASTATIN CALCIUM 20 MG/1
20 TABLET, FILM COATED ORAL NIGHTLY
Qty: 30 TABLET | Refills: 11 | OUTPATIENT
Start: 2023-02-14

## 2023-02-14 RX ORDER — AMLODIPINE BESYLATE 2.5 MG/1
TABLET ORAL
Qty: 30 TABLET | Refills: 11 | OUTPATIENT
Start: 2023-02-14

## 2023-02-14 RX ORDER — ENALAPRIL MALEATE 5 MG/1
5 TABLET ORAL DAILY
Qty: 30 TABLET | Refills: 11 | OUTPATIENT
Start: 2023-02-14

## 2023-09-22 ENCOUNTER — TELEPHONE (OUTPATIENT)
Dept: INTERNAL MEDICINE CLINIC | Facility: CLINIC | Age: 53
End: 2023-09-22

## 2023-09-22 DIAGNOSIS — Z12.5 SCREENING FOR PROSTATE CANCER: ICD-10-CM

## 2023-09-22 DIAGNOSIS — Z00.00 ANNUAL PHYSICAL EXAM: Primary | ICD-10-CM

## 2023-09-22 DIAGNOSIS — E78.00 PURE HYPERCHOLESTEROLEMIA: ICD-10-CM

## 2023-09-22 DIAGNOSIS — I15.9 SECONDARY HYPERTENSION: ICD-10-CM

## 2023-09-22 RX ORDER — ENALAPRIL MALEATE 5 MG/1
5 TABLET ORAL DAILY
Qty: 90 TABLET | Refills: 0 | Status: CANCELLED | OUTPATIENT
Start: 2023-09-22

## 2023-09-22 RX ORDER — AMLODIPINE BESYLATE 2.5 MG/1
2.5 TABLET ORAL DAILY
Qty: 30 TABLET | Refills: 1 | Status: SHIPPED | OUTPATIENT
Start: 2023-09-22

## 2023-09-22 RX ORDER — ENALAPRIL MALEATE 5 MG/1
5 TABLET ORAL DAILY
Qty: 30 TABLET | Refills: 1 | Status: SHIPPED | OUTPATIENT
Start: 2023-09-22

## 2023-09-22 NOTE — TELEPHONE ENCOUNTER
Spoke to pt - \"he takes them regularly\"; discussed the prescription  8 months ago  He reports he has labs done somewhere else every year. 60 day supply sent in and pt will need to be seen  Requested Prescriptions     Signed Prescriptions Disp Refills    enalapril 5 MG Oral Tab 30 tablet 1     Sig: TAKE 1 TABLET (5 MG TOTAL) BY MOUTH DAILY. Authorizing Provider: Monika Scales     Ordering User: KEITH HURTADO    amLODIPine 2.5 MG Oral Tab 30 tablet 1     Sig: Take 1 tablet (2.5 mg total) by mouth daily.      Authorizing Provider: Monika Scales     Ordering User: Matheus Vilchis

## 2023-09-22 NOTE — TELEPHONE ENCOUNTER
Spoke to pt regarding alternate TE. States he has 2 tabs left of enalapril. Last OV 12/21/20. Cancelled last scheduled physical in 2022. Pt has annual physical scheduled 11/29/23. Last labs done in 2020    To Osmin Lockhart to please review and advise on refill request. 90 day supply pended.

## 2023-09-22 NOTE — TELEPHONE ENCOUNTER
Patient calling to request blood work prior to:    [x] physical    [] check-up      [] other    Patient uses:  [x] [facility] labs [] [address] location:       Fax #:    Patient prefers to be notified once labs are placed by: [x] phone call  [] my chart message  Phone #[phone]

## 2023-09-22 NOTE — TELEPHONE ENCOUNTER
Lab orders placed per protocol. Called pt and notified. He inquired about enalapril refill request. See 9/22 refill encounter.

## 2023-09-22 NOTE — TELEPHONE ENCOUNTER
To [facility] for consult, LOV 12/21/2020, pt has an appt 11/29/23, labs also from 2020.      Please advise on request.

## 2023-10-17 RX ORDER — ENALAPRIL MALEATE 5 MG/1
5 TABLET ORAL DAILY
Qty: 30 TABLET | Refills: 1 | OUTPATIENT
Start: 2023-10-17

## 2023-10-17 NOTE — TELEPHONE ENCOUNTER
On 9/22/23 #30 and 1 refill sent  Pt should have 1 refill left  Per Emily's documentation from 9/22: \"60 day supply sent in and pt will need to be seen \"    Current refill request refused due to refill is either a duplicate request or has active refills at the pharmacy. Check previous templates. Requested Prescriptions     Refused Prescriptions Disp Refills    enalapril 5 MG Oral Tab 30 tablet 1     Sig: Take 1 tablet (5 mg total) by mouth daily.      Refused By: Laurie Krishna Books     Reason for Refusal: Patient has requested refill too soon

## 2023-10-20 RX ORDER — AMLODIPINE BESYLATE 2.5 MG/1
2.5 TABLET ORAL DAILY
Qty: 30 TABLET | Refills: 1 | OUTPATIENT
Start: 2023-10-20

## 2023-10-20 NOTE — TELEPHONE ENCOUNTER
Current refill request refused due to refill is either a duplicate request or has active refills at the pharmacy. Check previous templates. Requested Prescriptions     Refused Prescriptions Disp Refills    amLODIPine 2.5 MG Oral Tab 30 tablet 1     Sig: Take 1 tablet (2.5 mg total) by mouth daily.      Refused By: Mike Nolen     Reason for Refusal: Request already responded to by other means (e.g. phone or fax)

## 2023-10-28 ENCOUNTER — LAB ENCOUNTER (OUTPATIENT)
Dept: LAB | Age: 53
End: 2023-10-28
Attending: INTERNAL MEDICINE
Payer: COMMERCIAL

## 2023-10-28 DIAGNOSIS — E78.00 PURE HYPERCHOLESTEROLEMIA: ICD-10-CM

## 2023-10-28 DIAGNOSIS — Z00.00 ANNUAL PHYSICAL EXAM: ICD-10-CM

## 2023-10-28 DIAGNOSIS — I15.9 SECONDARY HYPERTENSION: ICD-10-CM

## 2023-10-28 DIAGNOSIS — Z12.5 SCREENING FOR PROSTATE CANCER: ICD-10-CM

## 2023-10-28 LAB
ALBUMIN SERPL-MCNC: 3.8 G/DL (ref 3.4–5)
ALBUMIN/GLOB SERPL: 1.1 {RATIO} (ref 1–2)
ALP LIVER SERPL-CCNC: 62 U/L
ALT SERPL-CCNC: 27 U/L
ANION GAP SERPL CALC-SCNC: 6 MMOL/L (ref 0–18)
AST SERPL-CCNC: 14 U/L (ref 15–37)
BASOPHILS # BLD AUTO: 0.03 X10(3) UL (ref 0–0.2)
BASOPHILS NFR BLD AUTO: 0.4 %
BILIRUB SERPL-MCNC: 0.5 MG/DL (ref 0.1–2)
BILIRUB UR QL: NEGATIVE
BUN BLD-MCNC: 23 MG/DL (ref 7–18)
BUN/CREAT SERPL: 19.2 (ref 10–20)
CALCIUM BLD-MCNC: 9.5 MG/DL (ref 8.5–10.1)
CHLORIDE SERPL-SCNC: 108 MMOL/L (ref 98–112)
CHOLEST SERPL-MCNC: 204 MG/DL (ref ?–200)
CLARITY UR: CLEAR
CO2 SERPL-SCNC: 26 MMOL/L (ref 21–32)
COLOR UR: COLORLESS
COMPLEXED PSA SERPL-MCNC: 1.44 NG/ML (ref ?–4)
CREAT BLD-MCNC: 1.2 MG/DL
DEPRECATED RDW RBC AUTO: 38 FL (ref 35.1–46.3)
EGFRCR SERPLBLD CKD-EPI 2021: 73 ML/MIN/1.73M2 (ref 60–?)
EOSINOPHIL # BLD AUTO: 0.18 X10(3) UL (ref 0–0.7)
EOSINOPHIL NFR BLD AUTO: 2.7 %
ERYTHROCYTE [DISTWIDTH] IN BLOOD BY AUTOMATED COUNT: 11.9 % (ref 11–15)
FASTING PATIENT LIPID ANSWER: YES
FASTING STATUS PATIENT QL REPORTED: YES
GLOBULIN PLAS-MCNC: 3.5 G/DL (ref 2.8–4.4)
GLUCOSE BLD-MCNC: 101 MG/DL (ref 70–99)
GLUCOSE UR-MCNC: NORMAL MG/DL
HCT VFR BLD AUTO: 45.7 %
HDLC SERPL-MCNC: 55 MG/DL (ref 40–59)
HGB BLD-MCNC: 15 G/DL
HGB UR QL STRIP.AUTO: NEGATIVE
IMM GRANULOCYTES # BLD AUTO: 0.04 X10(3) UL (ref 0–1)
IMM GRANULOCYTES NFR BLD: 0.6 %
KETONES UR-MCNC: NEGATIVE MG/DL
LDLC SERPL CALC-MCNC: 136 MG/DL (ref ?–100)
LEUKOCYTE ESTERASE UR QL STRIP.AUTO: NEGATIVE
LYMPHOCYTES # BLD AUTO: 1.76 X10(3) UL (ref 1–4)
LYMPHOCYTES NFR BLD AUTO: 26.1 %
MCH RBC QN AUTO: 28.5 PG (ref 26–34)
MCHC RBC AUTO-ENTMCNC: 32.8 G/DL (ref 31–37)
MCV RBC AUTO: 86.7 FL
MONOCYTES # BLD AUTO: 0.49 X10(3) UL (ref 0.1–1)
MONOCYTES NFR BLD AUTO: 7.3 %
NEUTROPHILS # BLD AUTO: 4.25 X10 (3) UL (ref 1.5–7.7)
NEUTROPHILS # BLD AUTO: 4.25 X10(3) UL (ref 1.5–7.7)
NEUTROPHILS NFR BLD AUTO: 62.9 %
NITRITE UR QL STRIP.AUTO: NEGATIVE
NONHDLC SERPL-MCNC: 149 MG/DL (ref ?–130)
OSMOLALITY SERPL CALC.SUM OF ELEC: 294 MOSM/KG (ref 275–295)
PH UR: 5.5 [PH] (ref 5–8)
PLATELET # BLD AUTO: 257 10(3)UL (ref 150–450)
POTASSIUM SERPL-SCNC: 5 MMOL/L (ref 3.5–5.1)
PROT SERPL-MCNC: 7.3 G/DL (ref 6.4–8.2)
PROT UR-MCNC: NEGATIVE MG/DL
RBC # BLD AUTO: 5.27 X10(6)UL
SODIUM SERPL-SCNC: 140 MMOL/L (ref 136–145)
SP GR UR STRIP: <1.005 (ref 1–1.03)
TRIGL SERPL-MCNC: 74 MG/DL (ref 30–149)
TSI SER-ACNC: 1.09 MIU/ML (ref 0.36–3.74)
UROBILINOGEN UR STRIP-ACNC: NORMAL
VLDLC SERPL CALC-MCNC: 13 MG/DL (ref 0–30)
WBC # BLD AUTO: 6.8 X10(3) UL (ref 4–11)

## 2023-10-28 PROCEDURE — 36415 COLL VENOUS BLD VENIPUNCTURE: CPT

## 2023-10-28 PROCEDURE — 80061 LIPID PANEL: CPT

## 2023-10-28 PROCEDURE — 81003 URINALYSIS AUTO W/O SCOPE: CPT

## 2023-10-28 PROCEDURE — 84443 ASSAY THYROID STIM HORMONE: CPT

## 2023-10-28 PROCEDURE — 80053 COMPREHEN METABOLIC PANEL: CPT

## 2023-10-28 PROCEDURE — 85025 COMPLETE CBC W/AUTO DIFF WBC: CPT

## 2023-11-17 RX ORDER — AMLODIPINE BESYLATE 2.5 MG/1
2.5 TABLET ORAL DAILY
Qty: 7 TABLET | Refills: 0 | Status: SHIPPED | OUTPATIENT
Start: 2023-11-17

## 2023-11-17 NOTE — TELEPHONE ENCOUNTER
Pt well overdue for visit. Has appt on 11/29. Enough sent to last until appt day. Refill request is for a maintenance medication and has met the criteria specified in the Ambulatory Medication Refill Standing Order for eligibility, visits, laboratory, alerts and was sent to the requested pharmacy. Requested Prescriptions     Signed Prescriptions Disp Refills    amLODIPine 2.5 MG Oral Tab 7 tablet 0     Sig: Take 1 tablet (2.5 mg total) by mouth daily.  Last refill until patient seen     Authorizing Provider: Lauren Hernandez     Ordering User: Ramiro Lamas

## 2023-11-21 RX ORDER — ENALAPRIL MALEATE 5 MG/1
5 TABLET ORAL DAILY
Qty: 10 TABLET | Refills: 0 | Status: SHIPPED | OUTPATIENT
Start: 2023-11-21

## 2023-11-21 NOTE — TELEPHONE ENCOUNTER
Pt well overdue for visit. Has appt on 11/29. Enough sent to last until appt day. Refill request is for a maintenance medication and has met the criteria specified in the Ambulatory Medication Refill Standing Order for eligibility, visits, laboratory, alerts and was sent to the requested pharmacy. Requested Prescriptions     Signed Prescriptions Disp Refills    enalapril 5 MG Oral Tab 10 tablet 0     Sig: TAKE 1 TABLET (5 MG TOTAL) BY MOUTH DAILY.      Authorizing Provider: Zona Favre     Ordering User: Saul Ward [id number]

## 2023-11-22 RX ORDER — AMLODIPINE BESYLATE 2.5 MG/1
2.5 TABLET ORAL DAILY
Qty: 5 TABLET | Refills: 0 | Status: SHIPPED | OUTPATIENT
Start: 2023-11-22

## 2023-11-22 NOTE — TELEPHONE ENCOUNTER
Refilled to appt date     Refill request is for a maintenance medication and has met the criteria specified in the Ambulatory Medication Refill Standing Order for eligibility, visits, laboratory, alerts and was sent to the requested pharmacy.     Requested Prescriptions     Signed Prescriptions Disp Refills    amLODIPine 2.5 MG Oral Tab 5 tablet 0     Sig: TAKE 1 TABLET BY MOUTH EVERY DAY     Authorizing Provider: Massiel Cole     Ordering User: Belgica Hodge

## 2023-11-29 ENCOUNTER — OFFICE VISIT (OUTPATIENT)
Dept: INTERNAL MEDICINE CLINIC | Facility: CLINIC | Age: 53
End: 2023-11-29

## 2023-11-29 ENCOUNTER — LAB ENCOUNTER (OUTPATIENT)
Dept: LAB | Age: 53
End: 2023-11-29
Attending: INTERNAL MEDICINE
Payer: COMMERCIAL

## 2023-11-29 VITALS
BODY MASS INDEX: 32.49 KG/M2 | WEIGHT: 207 LBS | HEIGHT: 67 IN | SYSTOLIC BLOOD PRESSURE: 126 MMHG | TEMPERATURE: 98 F | DIASTOLIC BLOOD PRESSURE: 78 MMHG | OXYGEN SATURATION: 98 % | HEART RATE: 97 BPM

## 2023-11-29 DIAGNOSIS — I15.9 SECONDARY HYPERTENSION: ICD-10-CM

## 2023-11-29 DIAGNOSIS — E78.00 PURE HYPERCHOLESTEROLEMIA: ICD-10-CM

## 2023-11-29 DIAGNOSIS — Z00.00 ANNUAL PHYSICAL EXAM: Primary | ICD-10-CM

## 2023-11-29 DIAGNOSIS — K21.00 GASTROESOPHAGEAL REFLUX DISEASE WITH ESOPHAGITIS WITHOUT HEMORRHAGE: ICD-10-CM

## 2023-11-29 LAB
ATRIAL RATE: 76 BPM
P AXIS: 6 DEGREES
P-R INTERVAL: 146 MS
Q-T INTERVAL: 380 MS
QRS DURATION: 86 MS
QTC CALCULATION (BEZET): 427 MS
R AXIS: 10 DEGREES
T AXIS: 27 DEGREES
VENTRICULAR RATE: 76 BPM

## 2023-11-29 PROCEDURE — 93005 ELECTROCARDIOGRAM TRACING: CPT

## 2023-11-29 PROCEDURE — 3008F BODY MASS INDEX DOCD: CPT | Performed by: INTERNAL MEDICINE

## 2023-11-29 PROCEDURE — 93010 ELECTROCARDIOGRAM REPORT: CPT | Performed by: INTERNAL MEDICINE

## 2023-11-29 PROCEDURE — 3078F DIAST BP <80 MM HG: CPT | Performed by: INTERNAL MEDICINE

## 2023-11-29 PROCEDURE — 99396 PREV VISIT EST AGE 40-64: CPT | Performed by: INTERNAL MEDICINE

## 2023-11-29 PROCEDURE — 3074F SYST BP LT 130 MM HG: CPT | Performed by: INTERNAL MEDICINE

## 2023-11-29 RX ORDER — ENALAPRIL MALEATE 5 MG/1
5 TABLET ORAL DAILY
Qty: 90 TABLET | Refills: 3 | Status: SHIPPED | OUTPATIENT
Start: 2023-11-29

## 2023-11-29 RX ORDER — ATORVASTATIN CALCIUM 20 MG/1
20 TABLET, FILM COATED ORAL NIGHTLY
Qty: 90 TABLET | Refills: 3 | Status: SHIPPED | OUTPATIENT
Start: 2023-11-29

## 2023-11-29 RX ORDER — AMLODIPINE BESYLATE 2.5 MG/1
2.5 TABLET ORAL DAILY
Qty: 90 TABLET | Refills: 3 | Status: SHIPPED | OUTPATIENT
Start: 2023-11-29

## 2023-12-02 ENCOUNTER — TELEPHONE (OUTPATIENT)
Dept: INTERNAL MEDICINE CLINIC | Facility: CLINIC | Age: 53
End: 2023-12-02

## 2024-06-25 ENCOUNTER — TELEPHONE (OUTPATIENT)
Dept: INTERNAL MEDICINE CLINIC | Facility: CLINIC | Age: 54
End: 2024-06-25

## 2024-06-25 RX ORDER — AZITHROMYCIN 250 MG/1
TABLET, FILM COATED ORAL
Qty: 6 TABLET | Refills: 0 | Status: SHIPPED | OUTPATIENT
Start: 2024-06-25 | End: 2024-06-29

## 2024-06-25 NOTE — TELEPHONE ENCOUNTER
Spoke with patient's mother (OK per HIPAA).    Pt was exposed to pertussis today.  Pt has no symptoms.  They were advised to have z-emily on hand (pended)- he would only start it if symptoms arise.      Please advise.

## 2024-06-25 NOTE — TELEPHONE ENCOUNTER
Patient's mom, Veronika, called      Patient's nephew was diagnosed with pertussis today     He had symptoms yesterday & was with patient     Nephew's doctor recommends Syfromax which could be started now or at start of cough    Requests call back with Dr Hardin's recommendations [phone]      Message 2 of 2 w/same last name

## 2024-09-14 NOTE — TELEPHONE ENCOUNTER
"Patient presented with increased generalized weakness and frequent falls, ongoing diarrhea, dysuria, poor oral intake, and at least 70-80 pound weight loss over the past 1 year. Has been following with GI, had colonoscopy May 2024 with poor prep--had EGD with esophagitis and gastritis  Labs with multiple derangements including hyperglycemia, hypokalemia and hypomagnesemia on admission, improved.   C. difficile positive as above  Patient reports she has not been using insulin due to \"insurance issue\" and inability to self inject due to her neuropathy  CT abdomen and pelvis without any obvious malignancy  Treatment as per respective issues  PT/OT recommending rehab.   Endocrinology evaluation appreciated.  " Spoke with patient's mother, Veronika (OK per HIPAA). Relayed MD message. Veronika verbalized understanding.     Advised pt/Veronika call back with any questions/concerns/worsening symptoms.

## 2024-12-02 RX ORDER — ENALAPRIL MALEATE 5 MG/1
5 TABLET ORAL DAILY
Qty: 90 TABLET | Refills: 3 | Status: CANCELLED | OUTPATIENT
Start: 2024-12-02

## 2024-12-02 RX ORDER — ATORVASTATIN CALCIUM 20 MG/1
20 TABLET, FILM COATED ORAL NIGHTLY
Qty: 90 TABLET | Refills: 3 | Status: CANCELLED | OUTPATIENT
Start: 2024-12-02

## 2024-12-02 RX ORDER — AMLODIPINE BESYLATE 2.5 MG/1
2.5 TABLET ORAL DAILY
Qty: 90 TABLET | Refills: 3 | Status: CANCELLED | OUTPATIENT
Start: 2024-12-02

## 2024-12-26 RX ORDER — ENALAPRIL MALEATE 5 MG/1
5 TABLET ORAL DAILY
Qty: 30 TABLET | Refills: 0 | Status: SHIPPED | OUTPATIENT
Start: 2024-12-26

## 2024-12-26 NOTE — TELEPHONE ENCOUNTER
Dr. Lugo, please advise on medication pended for review. Please route back to RN triage pool as patient would like an update Thank you.    Triage RN, please call to update patient. Thank you.    Future Appointments   Date Time Provider Department Center   2025  3:30 PM Beulah Lugo MD [facility]     Spoke to patient (name and  of patient verified). He is calling to report he is out of enalapril. He has appointment to establish care with Dr. Lugo next week, requesting refill to bridge to appointment. Patient's preferred pharmacy verified. Patient requesting call back with an update. Thank you.

## 2024-12-26 NOTE — TELEPHONE ENCOUNTER
Patient contacted (name and  of patient verified). Dr. Lugo's message reviewed. Patient verbalizes understanding; denies further questions and agrees with plan of care.

## 2024-12-31 ENCOUNTER — TELEPHONE (OUTPATIENT)
Dept: INTERNAL MEDICINE CLINIC | Facility: CLINIC | Age: 54
End: 2024-12-31

## 2024-12-31 RX ORDER — FEXOFENADINE HCL AND PSEUDOEPHEDRINE HCI 60; 120 MG/1; MG/1
1 TABLET, EXTENDED RELEASE ORAL 2 TIMES DAILY PRN
COMMUNITY

## 2024-12-31 RX ORDER — TESTOSTERONE CYPIONATE 200 MG/ML
200 INJECTION, SOLUTION INTRAMUSCULAR WEEKLY
COMMUNITY
Start: 2024-11-16

## 2024-12-31 RX ORDER — ANASTROZOLE 1 MG/1
1 TABLET ORAL
COMMUNITY
Start: 2024-11-29

## 2024-12-31 RX ORDER — BUPRENORPHINE AND NALOXONE 4; 1 MG/1; MG/1
FILM, SOLUBLE BUCCAL; SUBLINGUAL
COMMUNITY
Start: 2024-12-10

## 2024-12-31 NOTE — TELEPHONE ENCOUNTER
COMPREHENSIVE MEDICATION REVIEW         Anthony Yoo MRN [id number]    4/15/1970 PCP No primary care provider on file.     Comments: Medication history completed by Ambulatory Clinic Pharmacist over the phone on 24. Patient has upcoming AWV with PCP on 25.     After thorough medication review, 6 discrepancies have been identified and corrected on patient's medication list. See updated list below:     Outpatient Encounter Medications as of 2024   Medication Sig    anastrozole 1 MG Oral Tab tab Take 1 tablet (1 mg total) by mouth twice a week.    testosterone cypionate 200 mg/mL Intramuscular Solution Inject 1 mL (200 mg total) into the muscle once a week.    Buprenorphine HCl-Naloxone HCl 4-1 MG Sublingual Film Place 0.5-1 Film under the tongue daily as needed.    fexofenadine-pseudoephedrine ER  MG Oral Tablet 12 Hr Take 1 tablet by mouth 2 (two) times daily as needed (allergies).    enalapril 5 MG Oral Tab Take 1 tablet (5 mg total) by mouth daily.    amLODIPine 2.5 MG Oral Tab Take 1 tablet (2.5 mg total) by mouth daily.    atorvastatin 20 MG Oral Tab Take 1 tablet (20 mg total) by mouth nightly. For cholesterol    Fluticasone Propionate 50 MCG/ACT Nasal Suspension 1 spray by Each Nare route daily as needed for Rhinitis.     Medication Assessment:   Reviewed all medications in detail with patient including dose, indication, timing of administration, monitoring parameters, and potential side effects of medications.     Patient reports taking amlodipine 2.5 mg daily and enalapril 5 mg daily as prescribed. He does monitor his blood pressure at home. Did instruct patient to bring recent readings in to appointment on Thursday for PCP review.     Did provide education and stressed the importance of taking medication just like prescribed to get the most benefit. Patient denies forgetting or missing medication doses and denies any questions or concerns with medications at this time.      Thank you,    Lynne Pike, PharmD, 12/31/2024, 1:50 PM  x

## 2025-01-08 ENCOUNTER — TELEPHONE (OUTPATIENT)
Dept: INTERNAL MEDICINE CLINIC | Facility: CLINIC | Age: 55
End: 2025-01-08

## 2025-01-08 DIAGNOSIS — I15.9 SECONDARY HYPERTENSION: ICD-10-CM

## 2025-01-08 DIAGNOSIS — E78.00 PURE HYPERCHOLESTEROLEMIA: Primary | ICD-10-CM

## 2025-01-09 RX ORDER — ENALAPRIL MALEATE 5 MG/1
5 TABLET ORAL DAILY
Qty: 90 TABLET | Refills: 1 | Status: SHIPPED | OUTPATIENT
Start: 2025-01-09

## 2025-01-09 RX ORDER — AMLODIPINE BESYLATE 2.5 MG/1
2.5 TABLET ORAL DAILY
Qty: 90 TABLET | Refills: 1 | Status: SHIPPED | OUTPATIENT
Start: 2025-01-09

## 2025-01-09 RX ORDER — ATORVASTATIN CALCIUM 20 MG/1
20 TABLET, FILM COATED ORAL NIGHTLY
Qty: 90 TABLET | Refills: 1 | Status: SHIPPED | OUTPATIENT
Start: 2025-01-09

## 2025-04-15 ENCOUNTER — OFFICE VISIT (OUTPATIENT)
Dept: INTERNAL MEDICINE CLINIC | Facility: CLINIC | Age: 55
End: 2025-04-15

## 2025-04-15 DIAGNOSIS — F11.20 NARCOTIC DEPENDENCE (HCC): ICD-10-CM

## 2025-04-15 DIAGNOSIS — G47.00 INSOMNIA, UNSPECIFIED TYPE: ICD-10-CM

## 2025-04-15 DIAGNOSIS — K21.00 GASTROESOPHAGEAL REFLUX DISEASE WITH ESOPHAGITIS WITHOUT HEMORRHAGE: ICD-10-CM

## 2025-04-15 DIAGNOSIS — Z12.11 ENCOUNTER FOR SCREENING COLONOSCOPY: ICD-10-CM

## 2025-04-15 DIAGNOSIS — Z00.00 PHYSICAL EXAM: Primary | ICD-10-CM

## 2025-04-15 DIAGNOSIS — M54.2 CERVICAL PAIN (NECK): ICD-10-CM

## 2025-04-15 DIAGNOSIS — I15.9 SECONDARY HYPERTENSION: ICD-10-CM

## 2025-04-15 DIAGNOSIS — E78.00 PURE HYPERCHOLESTEROLEMIA: ICD-10-CM

## 2025-04-15 DIAGNOSIS — E55.9 VITAMIN D DEFICIENCY: ICD-10-CM

## 2025-04-15 DIAGNOSIS — Z12.5 SCREENING PSA (PROSTATE SPECIFIC ANTIGEN): ICD-10-CM

## 2025-04-15 PROBLEM — M25.462 PAIN AND SWELLING OF LEFT KNEE: Status: RESOLVED | Noted: 2019-03-13 | Resolved: 2025-04-15

## 2025-04-15 PROBLEM — M77.8 TENDINITIS OF ELBOW: Status: RESOLVED | Noted: 2017-06-12 | Resolved: 2025-04-15

## 2025-04-15 PROBLEM — R07.89 NON-CARDIAC CHEST PAIN: Status: RESOLVED | Noted: 2018-01-20 | Resolved: 2025-04-15

## 2025-04-15 PROBLEM — M25.562 PAIN AND SWELLING OF LEFT KNEE: Status: RESOLVED | Noted: 2019-03-13 | Resolved: 2025-04-15

## 2025-04-15 NOTE — PATIENT INSTRUCTIONS
1. Physical exam  Physical exam instruction: Improve diet and exercise, complete fasting labs in the near future and you will be called with results 5-7 days after completed, call with questions.  Call the central scheduling number at [phone] to schedule at any of the [facility] locations    - CBC With Differential With Platelet  - Comp Metabolic Panel (14)  - TSH W Reflex To Free T4  - Urinalysis with Culture Reflex  - Lipid Panel    2. Secondary hypertension  Stable cont monitoring and management    3. Pure hypercholesterolemia  Nice job here, stay on the atorvastatin, and lets get the CT calcium scoring test done I will notify you with results once completed  - CT CALCIUM SCORING; Future    4. Gastroesophageal reflux disease with esophagitis without hemorrhage  Stable continue current monitoring management here    5. Insomnia, unspecified type  Stable, continue current monitor management, home medications    6. Cervical pain (neck)  Stable continue current monitoring management    7. Vitamin D deficiency  Stable continue current monitoring management  - Vitamin D    8. Hyperlipidemia, unspecified hyperlipidemia type  Stable continue current monitoring management  - Lipid Panel    9. Screening PSA (prostate specific antigen)  Stable continue current monitoring management  - PSA Total, Screen    10. Encounter for screening colonoscopy  Lets think about the Cologuard test, and building to the colonoscopy over time I will notify you with results once complete  - Surgery Referral - In Network    11. Narcotic dependence (HCC)  Lets stay with the specialist and replacement here, with weaning protocols per specialist

## 2025-04-16 NOTE — PROGRESS NOTES
Anthony Yoo is a 55 year old male who presents for a complete physical exam.   HPI:   Pt complains of:    Chief Complaint   Patient presents with    Physical     Establish care- colonoscopy orders     Patient is here today for physical exam, patient is up-to-date with age-related standard of care screening and vaccination recommendations, this was discussed at length and they verbalized understanding of any deficiencies.  We discussed Cologuard test, and augmentation to the colonoscopy, he verbalized understanding.    Testosterone replacement: Patient is currently seeing an outside doctor for testosterone replacement, also peptide hormone injections, he seems to be stable on these, and claims these are working very well.  Claims his testosterone numbers are done by lab draws, and claims since last from a few weeks ago was somewhere in the 800s from a total testosterone level.  Patient does take some estrogen blocking medication as well, has no breast tissue growth, or side effects that he is aware of.  He does exercise, mostly with lifting weights, cardiac activity has tailored down over the past few months, but he is remorseful.  Long discussion had about cardiovascular activity and exercise.  He verbalized understanding    History of narcotic dependence: Patient does have a history of narcotic dependence, is currently seeing an addiction specialist, has been placed on buprenorphine naloxone dissolvable    Hyperlipidemia patient seems stable, doing well, is stable on current medications, and does like the idea of CT calcium scoring testing when discussed, verbalized understanding.    Wt Readings from Last 3 Encounters:   04/15/25 (P) 190 lb 6.4 oz (86.4 kg)   11/29/23 207 lb (93.9 kg)   12/21/20 208 lb (94.3 kg)       Current Medications[1]   Past Medical History[2]   Past Surgical History[3]   Family History[4]   Social History:  Short Social Hx on File[5]      REVIEW OF SYSTEMS:   REVIEW OF  SYSTEMS:  Constitutional: Negative for Chills, fatigue, fever, malaise, weight gain and weight loss.  ENMT: Negative for Nasal drainage and sinus pressure.  Eyes: Negative for Vision changes.  Respiratory: Negative for Cough, dyspnea and wheezing.  Cardio: Negative Chest pain and irregular heartbeat/palpitations.  GI: Negative for Abdominal pain, constipation, diarrhea, heartburn, nausea and vomiting.  : Negative for Dysuria and urinary frequency.  Endocrine: Negative for Cold intolerance and heat intolerance.  Neuro: Negative for Gait disturbance and memory impairment.  Psych: Negative for Anxiety and depression.  Integumentary: Negative for Hives and rash.  MS: Negative muscle weakness. negative for joint pain  Hema/Lymph: Negative Easy bleeding and easy bruising.  Allergic/Immuno: Negative Environmental allergies and food allergies.    EXAM:   BP (P) 110/78   Pulse (P) 97   Temp (P) 97.8 °F (36.6 °C)   Ht (P) 5' 7\" (1.702 m)   Wt (P) 190 lb 6.4 oz (86.4 kg)   SpO2 (P) 98%   BMI (P) 29.82 kg/m²   Body mass index is 29.82 kg/m² (pended).   PHYSICAL EXAMINATION:  Vital signs: See chart   Gen. exam: Alert and oriented, in no acute distress   HEENT: Pupils equal and reactive to light and accommodation, moist mucous membranes  Neck exam:  Supple with baseline range of motion.  Normal thyroid trachea midline, no JVD  Heart exam: Regular rate and rhythm no murmurs no S3 no S4   Lung exam: No rales no rhonchi no wheezes  Abdominal exam: Soft nontender nondistended positive bowel sounds are normoactive  Extremities exam: no clubbing no cyanosis no edema  Skin exam: No obvious wounds, no rashes  Neurological exam: Cranial nerves II through XII intact, no gross deficits  Musculoskeletal exam: no Arthritis appreciated, no obvious deformity  : prostate not examined    ASSESSMENT AND PLAN:   Anthony Yoo is a 55 year old male who presents for a complete physical exam.  1. Physical exam  Physical exam  instruction: Improve diet and exercise, complete fasting labs in the near future and you will be called with results 5-7 days after completed, call with questions.  Call the central scheduling number at [phone] to schedule at any of the [facility] locations    - CBC With Differential With Platelet  - Comp Metabolic Panel (14)  - TSH W Reflex To Free T4  - Urinalysis with Culture Reflex  - Lipid Panel    2. Secondary hypertension  Stable cont monitoring and management    3. Pure hypercholesterolemia  Nice job here, stay on the atorvastatin, and lets get the CT calcium scoring test done I will notify you with results once completed  - CT CALCIUM SCORING; Future    4. Gastroesophageal reflux disease with esophagitis without hemorrhage  Stable continue current monitoring management here    5. Insomnia, unspecified type  Stable, continue current monitor management, home medications    6. Cervical pain (neck)  Stable continue current monitoring management    7. Vitamin D deficiency  Stable continue current monitoring management  - Vitamin D    8. Hyperlipidemia, unspecified hyperlipidemia type  Stable continue current monitoring management  - Lipid Panel    9. Screening PSA (prostate specific antigen)  Stable continue current monitoring management  - PSA Total, Screen    10. Encounter for screening colonoscopy  Lets think about the Cologuard test, and building to the colonoscopy over time I will notify you with results once complete  - Surgery Referral - In Network    11.  History of narcotic dependence (HCC)  Lets stay with the specialist and replacement here, with weaning protocols per specialist      Spent 45 minutes obtaining history, evaluating patient, discussing treatment options, diet, exercise, review of available labs and radiology reports, and completing documentation.    Jeff Anthony D'Amico, DO  4/15/2025  11:09 PM         [1]   Current Outpatient Medications   Medication Sig Dispense Refill    amLODIPine  2.5 MG Oral Tab Take 1 tablet (2.5 mg total) by mouth daily. 90 tablet 1    atorvastatin 20 MG Oral Tab Take 1 tablet (20 mg total) by mouth nightly. For cholesterol 90 tablet 1    enalapril 5 MG Oral Tab Take 1 tablet (5 mg total) by mouth daily. 90 tablet 1    anastrozole 1 MG Oral Tab tab Take 1 tablet (1 mg total) by mouth twice a week.      testosterone cypionate 200 mg/mL Intramuscular Solution Inject 1 mL (200 mg total) into the muscle once a week.      Buprenorphine HCl-Naloxone HCl 4-1 MG Sublingual Film Place 0.5-1 Film under the tongue daily as needed.      fexofenadine-pseudoephedrine ER  MG Oral Tablet 12 Hr Take 1 tablet by mouth 2 (two) times daily as needed (allergies). (Patient not taking: Reported on 4/15/2025)      Fluticasone Propionate 50 MCG/ACT Nasal Suspension 1 spray by Each Nare route daily as needed for Rhinitis. (Patient not taking: Reported on 4/15/2025)     [2]   Past Medical History:   High blood pressure    Hyperlipidemia    Lipid screening   [3]   Past Surgical History:  Procedure Laterality Date    Egd  03/2018    normal    Hernia surgery     [4] History reviewed. No pertinent family history.  [5]   Social History  Socioeconomic History    Marital status: Single   Tobacco Use    Smoking status: Never    Smokeless tobacco: Never   Vaping Use    Vaping status: Never Used   Substance and Sexual Activity    Alcohol use: Yes     Alcohol/week: 0.0 standard drinks of alcohol     Comment: beer & wine 2 glasses weekly    Drug use: No   Other Topics Concern    Caffeine Concern Yes     Comment: Coffee 2 cups daily

## 2025-07-14 ENCOUNTER — TELEPHONE (OUTPATIENT)
Dept: INTERNAL MEDICINE CLINIC | Facility: CLINIC | Age: 55
End: 2025-07-14

## 2025-07-14 DIAGNOSIS — E78.00 PURE HYPERCHOLESTEROLEMIA: ICD-10-CM

## 2025-07-14 DIAGNOSIS — I15.9 SECONDARY HYPERTENSION: ICD-10-CM

## 2025-07-14 NOTE — TELEPHONE ENCOUNTER
[facility] faxed refill request for the following:       Amlodipine Besylate  2.5 mg tabs.    Take 1 tablet by mouth every day    QTY  90           Atorvastatin  20 mg tablet       Take 1 tablet (20  mg) by mouth nightly for Choleserol.    QTY  90       Enalapril Maleate  5 mg tablets.   Take 1 tablet (5 mg total)   by mouth daily.    QTY  90        Pt. Was re-assigned to Dr. Milan Smart due to the closing of Dr. D'Amico's office.

## 2025-07-21 RX ORDER — ENALAPRIL MALEATE 5 MG/1
5 TABLET ORAL DAILY
Qty: 30 TABLET | Refills: 0 | Status: SHIPPED | OUTPATIENT
Start: 2025-07-21

## 2025-07-21 RX ORDER — ATORVASTATIN CALCIUM 20 MG/1
20 TABLET, FILM COATED ORAL NIGHTLY
Qty: 30 TABLET | Refills: 0 | Status: SHIPPED | OUTPATIENT
Start: 2025-07-21

## 2025-07-21 RX ORDER — AMLODIPINE BESYLATE 2.5 MG/1
2.5 TABLET ORAL DAILY
Qty: 30 TABLET | Refills: 0 | Status: SHIPPED | OUTPATIENT
Start: 2025-07-21

## 2025-07-21 NOTE — TELEPHONE ENCOUNTER
Please call patient to make an appointment.    Please rely message to patient     As you know, [facility] ([facility]) is no longer affiliated with [facility].  .  If you plan to follow your [facility] provider to their new practice, please direct your pharmacy to request refills directly from them.  If you plan to remain a patient with [facility], please schedule an appointment to establish care with one of our primary care providers within the next 90 days.  We will not be able to continue to gives refills on your medications without an appointment with one of our primary care providers.    Please call the office with any questions, [phone].    Thank you.    [facility]

## 2025-08-24 DIAGNOSIS — E78.00 PURE HYPERCHOLESTEROLEMIA: ICD-10-CM

## 2025-08-24 DIAGNOSIS — I15.9 SECONDARY HYPERTENSION: ICD-10-CM

## 2025-08-28 RX ORDER — AMLODIPINE BESYLATE 2.5 MG/1
2.5 TABLET ORAL DAILY
Qty: 30 TABLET | Refills: 0 | Status: SHIPPED | OUTPATIENT
Start: 2025-08-28

## 2025-08-28 RX ORDER — ATORVASTATIN CALCIUM 20 MG/1
20 TABLET, FILM COATED ORAL NIGHTLY
Qty: 30 TABLET | Refills: 0 | Status: SHIPPED | OUTPATIENT
Start: 2025-08-28

## 2025-08-28 RX ORDER — ENALAPRIL MALEATE 5 MG/1
5 TABLET ORAL DAILY
Qty: 30 TABLET | Refills: 0 | Status: SHIPPED | OUTPATIENT
Start: 2025-08-28

## (undated) NOTE — MR AVS SNAPSHOT
[place]  [address] [address] [place] [address]  [phone]               Thank you for choosing us for your health care visit with Jazz Nuñez DO. We are glad to serve you and happy to provide you with this summary of your visit.   Please he [address], [address] [address]     Phone:  [phone]    - AmLODIPine Besylate 2.5 MG Tabs      You can get these medications from any pharmacy     Bring a paper prescription for each of these medications    - TraMADol HCl 50 MG Tabs            MyChart including white bread, rice and pasta   Eat plenty of protein, keep the fat content low Sugars:  sodas and sports drinks, candies and desserts   Eat plenty of low-fat dairy products High fat meats and dairy   Choose whole grain products Foods high in sodiu

## (undated) NOTE — MR AVS SNAPSHOT
[place]  [facility] [address] [place] [address]  [phone]               Thank you for choosing us for your health care visit with Edith Mejia. MD Lashawn.  We are glad to serve you and happy to provide you with this summary of your visit.   Una Tendinitis of elbow      Instructions and Information about Your Health     None      Allergies as of Jun 12, 2017     No Known Allergies                Today's Vital Signs     BP Pulse Temp Height Weight BMI    144/80 mmHg 116 98.6 °F (37 °C) (Oral) 5' 7

## (undated) NOTE — LETTER
Hospital Discharge Documentation  Please phone to schedule a hospital follow up appointment. No discharge summary available at this time, below is the most recent progress note  for your review .         From: [phone] Hospitalist's Office  Phone: [phone] Allergies: No Known Allergies     Medications :   AMLODIPINE BESYLATE 2.5 MG Oral Tab,  TAKE 1 TABLET (2.5 MG TOTAL) BY MOUTH DAILY. atorvastatin 20 MG Oral Tab,  Take 1 tablet (20 mg total) by mouth nightly.  For cholesterol   ENALAPRIL MALEATE 5 MG Oral ECG Report  Interpretation  -------------------------- Sinus Rhythm -Short OK syndrome Anna = 118 Low voltage in precordial leads.  Poor R wave progression consider old anterior wall MI; may be normal variant or related to lead placement ABNORMAL When compar

## (undated) NOTE — LETTER
[facility] [place]  [address] [place] [address] [place] [address]  [phone]     ATT: Shadi Gracia was exposed to Covid 19 on 10/18/2020.  He tested negative on 10/19/2020 ,  Per physician recommendation

## (undated) NOTE — Clinical Note
4/17/2017          To Whom It May Concern:    Elsy Lawler is currently under my medical care and may not return to work at this time. Please excuse Dottie Teresacory for 1 weeks. He may return to work on 4/24/17.       If you require additional informatio